# Patient Record
Sex: MALE | Race: WHITE | NOT HISPANIC OR LATINO | Employment: FULL TIME | URBAN - METROPOLITAN AREA
[De-identification: names, ages, dates, MRNs, and addresses within clinical notes are randomized per-mention and may not be internally consistent; named-entity substitution may affect disease eponyms.]

---

## 2023-03-29 LAB
ALANINE AMINOTRANSFERASE (SGPT) (U/L) IN SER/PLAS: 18 U/L (ref 10–52)
ALBUMIN (G/DL) IN SER/PLAS: 4.1 G/DL (ref 3.4–5)
ALKALINE PHOSPHATASE (U/L) IN SER/PLAS: 68 U/L (ref 33–136)
ANION GAP IN SER/PLAS: 11 MMOL/L (ref 10–20)
ASPARTATE AMINOTRANSFERASE (SGOT) (U/L) IN SER/PLAS: 14 U/L (ref 9–39)
BILIRUBIN TOTAL (MG/DL) IN SER/PLAS: 0.9 MG/DL (ref 0–1.2)
C PEPTIDE (NG/ML) IN SER/PLAS: 0.3 NG/ML (ref 0.7–3.9)
CALCIUM (MG/DL) IN SER/PLAS: 9.6 MG/DL (ref 8.6–10.3)
CARBON DIOXIDE, TOTAL (MMOL/L) IN SER/PLAS: 31 MMOL/L (ref 21–32)
CHLORIDE (MMOL/L) IN SER/PLAS: 100 MMOL/L (ref 98–107)
CREATININE (MG/DL) IN SER/PLAS: 1.15 MG/DL (ref 0.5–1.3)
GFR MALE: 71 ML/MIN/1.73M2
GLUCOSE (MG/DL) IN SER/PLAS: 404 MG/DL (ref 74–99)
POTASSIUM (MMOL/L) IN SER/PLAS: 4.6 MMOL/L (ref 3.5–5.3)
PROTEIN TOTAL: 6.9 G/DL (ref 6.4–8.2)
SODIUM (MMOL/L) IN SER/PLAS: 137 MMOL/L (ref 136–145)
THYROTROPIN (MIU/L) IN SER/PLAS BY DETECTION LIMIT <= 0.05 MIU/L: 2.95 MIU/L (ref 0.44–3.98)
UREA NITROGEN (MG/DL) IN SER/PLAS: 15 MG/DL (ref 6–23)

## 2023-06-12 ENCOUNTER — OFFICE VISIT (OUTPATIENT)
Dept: PRIMARY CARE | Facility: CLINIC | Age: 63
End: 2023-06-12
Payer: COMMERCIAL

## 2023-06-12 VITALS
HEART RATE: 70 BPM | OXYGEN SATURATION: 98 % | DIASTOLIC BLOOD PRESSURE: 78 MMHG | HEIGHT: 71 IN | BODY MASS INDEX: 24.22 KG/M2 | TEMPERATURE: 97.2 F | SYSTOLIC BLOOD PRESSURE: 128 MMHG | WEIGHT: 173 LBS | RESPIRATION RATE: 14 BRPM

## 2023-06-12 DIAGNOSIS — R39.15 URINARY URGENCY: ICD-10-CM

## 2023-06-12 DIAGNOSIS — R06.83 SNORING: ICD-10-CM

## 2023-06-12 DIAGNOSIS — R53.83 FATIGUE, UNSPECIFIED TYPE: ICD-10-CM

## 2023-06-12 DIAGNOSIS — Z12.5 SCREENING FOR PROSTATE CANCER: ICD-10-CM

## 2023-06-12 DIAGNOSIS — Z00.00 PERIODIC HEALTH ASSESSMENT, GENERAL SCREENING, ADULT: ICD-10-CM

## 2023-06-12 DIAGNOSIS — G62.9 NEUROPATHY: ICD-10-CM

## 2023-06-12 DIAGNOSIS — Z72.0 TOBACCO USE: ICD-10-CM

## 2023-06-12 DIAGNOSIS — E11.9 CONTROLLED TYPE 2 DIABETES MELLITUS WITHOUT COMPLICATION, UNSPECIFIED WHETHER LONG TERM INSULIN USE (MULTI): ICD-10-CM

## 2023-06-12 DIAGNOSIS — E78.5 HYPERLIPIDEMIA, UNSPECIFIED HYPERLIPIDEMIA TYPE: Primary | ICD-10-CM

## 2023-06-12 PROCEDURE — 99406 BEHAV CHNG SMOKING 3-10 MIN: CPT | Performed by: INTERNAL MEDICINE

## 2023-06-12 PROCEDURE — 99214 OFFICE O/P EST MOD 30 MIN: CPT | Performed by: INTERNAL MEDICINE

## 2023-06-12 PROCEDURE — 3074F SYST BP LT 130 MM HG: CPT | Performed by: INTERNAL MEDICINE

## 2023-06-12 PROCEDURE — 3078F DIAST BP <80 MM HG: CPT | Performed by: INTERNAL MEDICINE

## 2023-06-12 RX ORDER — INSULIN LISPRO 100 [IU]/ML
INJECTION, SOLUTION INTRAVENOUS; SUBCUTANEOUS
COMMUNITY
End: 2023-11-01 | Stop reason: SDUPTHER

## 2023-06-12 RX ORDER — BUPROPION HYDROCHLORIDE 150 MG/1
150 TABLET ORAL EVERY MORNING
Qty: 30 TABLET | Refills: 1 | Status: SHIPPED | OUTPATIENT
Start: 2023-06-12 | End: 2023-12-08 | Stop reason: ALTCHOICE

## 2023-06-12 RX ORDER — ATORVASTATIN CALCIUM 10 MG/1
10 TABLET, FILM COATED ORAL DAILY
COMMUNITY

## 2023-06-12 RX ORDER — INSULIN GLARGINE 100 [IU]/ML
18-22 INJECTION, SOLUTION SUBCUTANEOUS EVERY MORNING
COMMUNITY
Start: 2023-03-16 | End: 2024-05-22

## 2023-06-12 RX ORDER — TAMSULOSIN HYDROCHLORIDE 0.4 MG/1
0.4 CAPSULE ORAL DAILY
Qty: 30 CAPSULE | Refills: 11 | Status: SHIPPED | OUTPATIENT
Start: 2023-06-12 | End: 2023-07-18 | Stop reason: SDUPTHER

## 2023-06-12 RX ORDER — LEVOTHYROXINE SODIUM 200 UG/1
TABLET ORAL
COMMUNITY
End: 2023-11-17

## 2023-06-12 RX ORDER — LEVOTHYROXINE SODIUM 75 UG/1
75 TABLET ORAL DAILY
COMMUNITY
End: 2023-07-18

## 2023-06-12 RX ORDER — BLOOD-GLUCOSE METER
EACH MISCELLANEOUS 2 TIMES DAILY
COMMUNITY
Start: 2020-10-06

## 2023-06-12 ASSESSMENT — ENCOUNTER SYMPTOMS
COUGH: 0
WHEEZING: 0
SHORTNESS OF BREATH: 0
CONSTIPATION: 0
PALPITATIONS: 0
ABDOMINAL PAIN: 0

## 2023-06-12 NOTE — PROGRESS NOTES
"Subjective   Patient ID: Van Feliz is a 62 y.o. male who presents for Snoring (Snoring , and would like checked for sleep apnea.).  Witnessed apnea during sleep.  He does smoke daily, but is down to 5/day.    He also complains of urinary urgency for several month.  No F/C/S.  No flank pain.    He is also concerned about his memory.  He states little things like forgetting peoples names.  He does not repeat things to others and he denies others commenting on his memory.      Review of Systems   Respiratory:  Negative for cough, shortness of breath and wheezing.         Snoring   Cardiovascular:  Negative for chest pain and palpitations.   Gastrointestinal:  Negative for abdominal pain and constipation.   Genitourinary:  Positive for urgency.     Objective   /78 (BP Location: Right arm, Patient Position: Sitting, BP Cuff Size: Adult)   Pulse 70   Temp 36.2 °C (97.2 °F) (Tympanic)   Resp 14   Ht 1.803 m (5' 11\")   Wt 78.5 kg (173 lb)   SpO2 98%   BMI 24.13 kg/m²     Physical Exam  Vitals reviewed.   Constitutional:       Appearance: Normal appearance.   HENT:      Head: Normocephalic.   Cardiovascular:      Rate and Rhythm: Normal rate.   Pulmonary:      Effort: Pulmonary effort is normal.   Musculoskeletal:         General: Normal range of motion.   Neurological:      General: No focal deficit present.      Mental Status: He is alert.   Psychiatric:         Mood and Affect: Mood normal.         Assessment/Plan   Problem List Items Addressed This Visit    None  Visit Diagnoses       Hyperlipidemia, unspecified hyperlipidemia type    -  Primary    Controlled type 2 diabetes mellitus without complication, unspecified whether long term insulin use (CMS/[facility])        Relevant Orders    Basic Metabolic Panel    Urinary urgency        Relevant Medications    tamsulosin (Flomax) 0.4 mg 24 hr capsule    Other Relevant Orders    Urinalysis with Reflex Microscopic    Tobacco use        Relevant Medications    " buPROPion XL (Wellbutrin XL) 150 mg 24 hr tablet    Snoring        Relevant Orders    Home sleep apnea test (HSAT)    Fatigue, unspecified type        Relevant Orders    CBC    Home sleep apnea test (HSAT)    Screening for prostate cancer        Relevant Orders    Prostate Specific Antigen    Neuropathy        Relevant Orders    Vitamin B12    Periodic health assessment, general screening, adult        Relevant Orders    CBC    Thyroid Stimulating Hormone    Urinalysis with Reflex Microscopic    Basic Metabolic Panel        We discussed all of the above.  I reviewed recent lab results.   He is a good historian and recalls information both recent and remote.  Discussed memory.  He does admit to stress/anxiety helping care for his mother in law who has dementia.  With memory issues we will check B12 and TSH.  Discussed sleep as well.    He states he was told he seems to stop breathing periodically at night - with this witnessed apnea we will proceed with sleep study.    We discussed his smoking.  Risk and benefits.  He is agreeable to trying wellbutrin.  An additional 3 minutes was spent discussing this.    We also discussed his urinary urgency that has been present for months.  We will check a PSA as well as a BUN/Cr.  We discussed flomax and he is agreeable to trying this.    We will follow up in 1 month to see how all of the above is oding - sooner if any issues.

## 2023-06-13 ENCOUNTER — LAB (OUTPATIENT)
Dept: LAB | Facility: LAB | Age: 63
End: 2023-06-13
Payer: COMMERCIAL

## 2023-06-13 DIAGNOSIS — R53.83 FATIGUE, UNSPECIFIED TYPE: ICD-10-CM

## 2023-06-13 DIAGNOSIS — Z00.00 PERIODIC HEALTH ASSESSMENT, GENERAL SCREENING, ADULT: ICD-10-CM

## 2023-06-13 DIAGNOSIS — R39.15 URINARY URGENCY: ICD-10-CM

## 2023-06-13 DIAGNOSIS — G62.9 NEUROPATHY: ICD-10-CM

## 2023-06-13 DIAGNOSIS — E11.9 CONTROLLED TYPE 2 DIABETES MELLITUS WITHOUT COMPLICATION, UNSPECIFIED WHETHER LONG TERM INSULIN USE (MULTI): ICD-10-CM

## 2023-06-13 DIAGNOSIS — Z12.5 SCREENING FOR PROSTATE CANCER: ICD-10-CM

## 2023-06-13 LAB
ANION GAP IN SER/PLAS: 12 MMOL/L (ref 10–20)
APPEARANCE, URINE: CLEAR
BILIRUBIN, URINE: NEGATIVE
BLOOD, URINE: NEGATIVE
CALCIUM (MG/DL) IN SER/PLAS: 9.8 MG/DL (ref 8.6–10.3)
CARBON DIOXIDE, TOTAL (MMOL/L) IN SER/PLAS: 32 MMOL/L (ref 21–32)
CHLORIDE (MMOL/L) IN SER/PLAS: 95 MMOL/L (ref 98–107)
COBALAMIN (VITAMIN B12) (PG/ML) IN SER/PLAS: 460 PG/ML (ref 211–911)
COLOR, URINE: ABNORMAL
CREATININE (MG/DL) IN SER/PLAS: 1.11 MG/DL (ref 0.5–1.3)
ERYTHROCYTE DISTRIBUTION WIDTH (RATIO) BY AUTOMATED COUNT: 11.7 % (ref 11.5–14.5)
ERYTHROCYTE MEAN CORPUSCULAR HEMOGLOBIN CONCENTRATION (G/DL) BY AUTOMATED: 35.8 G/DL (ref 32–36)
ERYTHROCYTE MEAN CORPUSCULAR VOLUME (FL) BY AUTOMATED COUNT: 94 FL (ref 80–100)
ERYTHROCYTES (10*6/UL) IN BLOOD BY AUTOMATED COUNT: 5.11 X10E12/L (ref 4.5–5.9)
GFR MALE: 74 ML/MIN/1.73M2
GLUCOSE (MG/DL) IN SER/PLAS: 480 MG/DL (ref 74–99)
GLUCOSE, URINE: ABNORMAL MG/DL
HEMATOCRIT (%) IN BLOOD BY AUTOMATED COUNT: 47.8 % (ref 41–52)
HEMOGLOBIN (G/DL) IN BLOOD: 17.1 G/DL (ref 13.5–17.5)
KETONES, URINE: NEGATIVE MG/DL
LEUKOCYTE ESTERASE, URINE: NEGATIVE
LEUKOCYTES (10*3/UL) IN BLOOD BY AUTOMATED COUNT: 7.8 X10E9/L (ref 4.4–11.3)
NITRITE, URINE: NEGATIVE
PH, URINE: 7 (ref 5–8)
PLATELETS (10*3/UL) IN BLOOD AUTOMATED COUNT: 190 X10E9/L (ref 150–450)
POTASSIUM (MMOL/L) IN SER/PLAS: 5.2 MMOL/L (ref 3.5–5.3)
PROSTATE SPECIFIC AG (NG/ML) IN SER/PLAS: 0.42 NG/ML (ref 0–4)
PROTEIN, URINE: NEGATIVE MG/DL
SODIUM (MMOL/L) IN SER/PLAS: 134 MMOL/L (ref 136–145)
SPECIFIC GRAVITY, URINE: 1.02 (ref 1–1.03)
THYROTROPIN (MIU/L) IN SER/PLAS BY DETECTION LIMIT <= 0.05 MIU/L: 16.17 MIU/L (ref 0.44–3.98)
UREA NITROGEN (MG/DL) IN SER/PLAS: 16 MG/DL (ref 6–23)
UROBILINOGEN, URINE: <2 MG/DL (ref 0–1.9)

## 2023-06-13 PROCEDURE — 81003 URINALYSIS AUTO W/O SCOPE: CPT

## 2023-06-13 PROCEDURE — 84443 ASSAY THYROID STIM HORMONE: CPT

## 2023-06-13 PROCEDURE — 80048 BASIC METABOLIC PNL TOTAL CA: CPT

## 2023-06-13 PROCEDURE — 85027 COMPLETE CBC AUTOMATED: CPT

## 2023-06-13 PROCEDURE — 36415 COLL VENOUS BLD VENIPUNCTURE: CPT

## 2023-06-13 PROCEDURE — 82607 VITAMIN B-12: CPT

## 2023-06-13 PROCEDURE — 84153 ASSAY OF PSA TOTAL: CPT

## 2023-06-14 ENCOUNTER — TELEPHONE (OUTPATIENT)
Dept: PRIMARY CARE | Facility: CLINIC | Age: 63
End: 2023-06-14
Payer: COMMERCIAL

## 2023-07-18 ENCOUNTER — OFFICE VISIT (OUTPATIENT)
Dept: PRIMARY CARE | Facility: CLINIC | Age: 63
End: 2023-07-18
Payer: COMMERCIAL

## 2023-07-18 VITALS
DIASTOLIC BLOOD PRESSURE: 60 MMHG | WEIGHT: 173 LBS | SYSTOLIC BLOOD PRESSURE: 130 MMHG | HEIGHT: 71 IN | BODY MASS INDEX: 24.22 KG/M2 | OXYGEN SATURATION: 97 % | RESPIRATION RATE: 14 BRPM | TEMPERATURE: 98 F | HEART RATE: 77 BPM

## 2023-07-18 DIAGNOSIS — R39.15 URINARY URGENCY: ICD-10-CM

## 2023-07-18 DIAGNOSIS — R35.0 BENIGN PROSTATIC HYPERPLASIA WITH URINARY FREQUENCY: ICD-10-CM

## 2023-07-18 DIAGNOSIS — E78.00 PURE HYPERCHOLESTEROLEMIA: ICD-10-CM

## 2023-07-18 DIAGNOSIS — E03.9 HYPOTHYROIDISM, UNSPECIFIED TYPE: Primary | ICD-10-CM

## 2023-07-18 DIAGNOSIS — Z00.00 PERIODIC HEALTH ASSESSMENT, GENERAL SCREENING, ADULT: ICD-10-CM

## 2023-07-18 DIAGNOSIS — N40.1 BENIGN PROSTATIC HYPERPLASIA WITH URINARY FREQUENCY: ICD-10-CM

## 2023-07-18 PROCEDURE — 99213 OFFICE O/P EST LOW 20 MIN: CPT | Performed by: INTERNAL MEDICINE

## 2023-07-18 RX ORDER — TAMSULOSIN HYDROCHLORIDE 0.4 MG/1
0.4 CAPSULE ORAL DAILY
Qty: 90 CAPSULE | Refills: 3 | Status: SHIPPED | OUTPATIENT
Start: 2023-07-18 | End: 2024-07-17

## 2023-07-18 RX ORDER — LEVOTHYROXINE SODIUM 88 UG/1
88 TABLET ORAL DAILY
Qty: 30 TABLET | Refills: 11 | Status: SHIPPED | OUTPATIENT
Start: 2023-07-18 | End: 2024-07-17

## 2023-07-18 ASSESSMENT — ENCOUNTER SYMPTOMS
DIARRHEA: 0
WHEEZING: 0
ABDOMINAL PAIN: 0
CONSTIPATION: 0
PALPITATIONS: 0
SHORTNESS OF BREATH: 0
COUGH: 0

## 2023-07-18 NOTE — PROGRESS NOTES
"Subjective   Patient ID: Van Feliz is a 63 y.o. male who presents for Follow-up (Follow up , lab work.).  Feeling well overall.    No new complaints.  He does follow with Endo for his DM.    He states his energy level is up and down.  He is fairly active.  No issues with CP, SOB or dizzy spells.    He had issues with urinary urgency, but flomax has helped a lot.  No side effects.     Review of Systems   Respiratory:  Negative for cough, shortness of breath and wheezing.    Cardiovascular:  Negative for chest pain and palpitations.   Gastrointestinal:  Negative for abdominal pain, constipation and diarrhea.       Objective   /60 (BP Location: Left arm, Patient Position: Sitting, BP Cuff Size: Adult)   Pulse 77   Temp 36.7 °C (98 °F) (Tympanic)   Resp 14   Ht 1.803 m (5' 11\")   Wt 78.5 kg (173 lb)   SpO2 97%   BMI 24.13 kg/m²     Physical Exam  Vitals reviewed.   Constitutional:       Appearance: Normal appearance.   HENT:      Head: Normocephalic.   Cardiovascular:      Rate and Rhythm: Normal rate.   Pulmonary:      Effort: Pulmonary effort is normal.   Musculoskeletal:         General: Normal range of motion.   Neurological:      General: No focal deficit present.      Mental Status: He is alert.   Psychiatric:         Mood and Affect: Mood normal.         Assessment/Plan   Problem List Items Addressed This Visit    None  Visit Diagnoses       Hypothyroidism, unspecified type    -  Primary    Relevant Medications    levothyroxine (Synthroid, Levoxyl) 88 mcg tablet    Other Relevant Orders    Thyroid Stimulating Hormone    Pure hypercholesterolemia        Benign prostatic hyperplasia with urinary frequency        Urinary urgency        Relevant Medications    tamsulosin (Flomax) 0.4 mg 24 hr capsule    Periodic health assessment, general screening, adult        Relevant Orders    Referral to Gastroenterology        We reviewed and discussed all of the above including current medications and recent " lab results.  Reviewed his previous colonoscopy and he is due this year.    Discussed medication changes.    Follow up in 6 months with a 2 month recheck of his TSH.

## 2023-11-01 DIAGNOSIS — E10.69 TYPE 1 DIABETES MELLITUS WITH OTHER SPECIFIED COMPLICATION (MULTI): ICD-10-CM

## 2023-11-02 RX ORDER — INSULIN LISPRO 100 [IU]/ML
INJECTION, SOLUTION INTRAVENOUS; SUBCUTANEOUS
Qty: 15 ML | Refills: 2 | Status: SHIPPED | OUTPATIENT
Start: 2023-11-02

## 2023-11-17 DIAGNOSIS — E03.9 HYPOTHYROIDISM, UNSPECIFIED TYPE: Primary | ICD-10-CM

## 2023-11-17 RX ORDER — LEVOTHYROXINE SODIUM 200 UG/1
TABLET ORAL
Qty: 90 TABLET | Refills: 0 | Status: SHIPPED | OUTPATIENT
Start: 2023-11-17 | End: 2024-02-20

## 2023-12-08 ENCOUNTER — OFFICE VISIT (OUTPATIENT)
Dept: ENDOCRINOLOGY | Facility: CLINIC | Age: 63
End: 2023-12-08
Payer: COMMERCIAL

## 2023-12-08 VITALS
DIASTOLIC BLOOD PRESSURE: 76 MMHG | HEIGHT: 72 IN | SYSTOLIC BLOOD PRESSURE: 130 MMHG | BODY MASS INDEX: 23.98 KG/M2 | WEIGHT: 177 LBS

## 2023-12-08 DIAGNOSIS — I10 ESSENTIAL HYPERTENSION, BENIGN: ICD-10-CM

## 2023-12-08 DIAGNOSIS — E03.9 HYPOTHYROIDISM, UNSPECIFIED TYPE: ICD-10-CM

## 2023-12-08 DIAGNOSIS — E10.9 TYPE 1 DIABETES MELLITUS WITHOUT COMPLICATION (MULTI): Primary | ICD-10-CM

## 2023-12-08 DIAGNOSIS — R73.9 HYPERGLYCEMIA: ICD-10-CM

## 2023-12-08 LAB
POC FINGERSTICK BLOOD GLUCOSE: 173 MG/DL (ref 70–100)
POC HEMOGLOBIN A1C: 11 % (ref 4.2–6.5)

## 2023-12-08 PROCEDURE — 3078F DIAST BP <80 MM HG: CPT | Performed by: HOSPITALIST

## 2023-12-08 PROCEDURE — 3075F SYST BP GE 130 - 139MM HG: CPT | Performed by: HOSPITALIST

## 2023-12-08 PROCEDURE — 99214 OFFICE O/P EST MOD 30 MIN: CPT | Performed by: HOSPITALIST

## 2023-12-08 PROCEDURE — 82962 GLUCOSE BLOOD TEST: CPT | Performed by: HOSPITALIST

## 2023-12-08 PROCEDURE — 83036 HEMOGLOBIN GLYCOSYLATED A1C: CPT | Performed by: HOSPITALIST

## 2023-12-08 RX ORDER — BLOOD-GLUCOSE SENSOR
EACH MISCELLANEOUS
Qty: 6 EACH | Refills: 2 | Status: SHIPPED | OUTPATIENT
Start: 2023-12-08

## 2023-12-08 RX ORDER — BLOOD-GLUCOSE,RECEIVER,CONT
EACH MISCELLANEOUS
Qty: 1 EACH | Refills: 0 | Status: SHIPPED | OUTPATIENT
Start: 2023-12-08

## 2023-12-08 RX ORDER — GLUCAGON INJECTION, SOLUTION 1 MG/.2ML
1 INJECTION, SOLUTION SUBCUTANEOUS ONCE AS NEEDED
Qty: 0.4 ML | Refills: 1 | Status: SHIPPED | OUTPATIENT
Start: 2023-12-08

## 2023-12-08 RX ORDER — IBUPROFEN 200 MG
16 TABLET ORAL AS NEEDED
Qty: 50 TABLET | Refills: 12 | Status: SHIPPED | OUTPATIENT
Start: 2023-12-08 | End: 2024-12-07

## 2023-12-08 ASSESSMENT — ENCOUNTER SYMPTOMS
ARTHRALGIAS: 0
NERVOUS/ANXIOUS: 0
VOICE CHANGE: 0
TREMORS: 0
CHEST TIGHTNESS: 0
SLEEP DISTURBANCE: 0
TROUBLE SWALLOWING: 0
LIGHT-HEADEDNESS: 0
SORE THROAT: 0
HEADACHES: 0
CONSTITUTIONAL NEGATIVE: 1
ABDOMINAL DISTENTION: 0
DIARRHEA: 0
VOMITING: 0
SHORTNESS OF BREATH: 0
DYSURIA: 0
EYE ITCHING: 0
AGITATION: 0
PHOTOPHOBIA: 0
PALPITATIONS: 0
CONSTIPATION: 0
NAUSEA: 0
ABDOMINAL PAIN: 0
FREQUENCY: 0

## 2023-12-08 NOTE — PROGRESS NOTES
Subjective   Patient ID: Van Feliz is a 63 y.o. male who presents for Diabetes (Dx: 2020/PCP: Dr. Rodarte- needs to schedule appointment /Podiatry: does not see one /Eye exam: yearly /Patient testing glucose once daily; did not bring meter. /Last hga1c 3/16/23 14.1%/At times missing lunch insulin- so would increase dinner dose ) and Hypothyroidism (Takes thyroid medication with other medications ).  Lab Results   Component Value Date    HGBA1C 11.0 (A) 12/08/2023      HPI  See AP    Review of Systems   Constitutional: Negative.    HENT:  Negative for sore throat, trouble swallowing and voice change.    Eyes:  Negative for photophobia, itching and visual disturbance.   Respiratory:  Negative for chest tightness and shortness of breath.    Cardiovascular:  Negative for chest pain and palpitations.   Gastrointestinal:  Negative for abdominal distention, abdominal pain, constipation, diarrhea, nausea and vomiting.   Endocrine: Negative for cold intolerance, heat intolerance and polyuria.   Genitourinary:  Negative for dysuria and frequency.   Musculoskeletal:  Negative for arthralgias.   Skin:  Negative for pallor.   Allergic/Immunologic: Negative for environmental allergies.   Neurological:  Negative for tremors, light-headedness and headaches.   Psychiatric/Behavioral:  Negative for agitation and sleep disturbance. The patient is not nervous/anxious.        Objective   Physical Exam  Constitutional:       Appearance: Normal appearance.   HENT:      Head: Normocephalic.      Nose: Nose normal.      Mouth/Throat:      Mouth: Mucous membranes are moist.   Eyes:      Extraocular Movements: Extraocular movements intact.   Cardiovascular:      Rate and Rhythm: Normal rate.   Pulmonary:      Effort: Pulmonary effort is normal. No respiratory distress.   Abdominal:      General: There is no distension.   Musculoskeletal:         General: Normal range of motion.      Cervical back: Normal range of motion and neck  "supple.   Skin:     General: Skin is warm and dry.   Neurological:      Mental Status: He is alert and oriented to person, place, and time.   Psychiatric:         Mood and Affect: Mood normal.     Visit Vitals  /76   Ht 1.816 m (5' 11.5\")   Wt 80.3 kg (177 lb)   BMI 24.34 kg/m²   Smoking Status Every Day   BSA 2.01 m²        Assessment/Plan   Diagnoses and all orders for this visit:  Type 1 diabetes mellitus without complication (CMS/HCC)  -     POCT glycosylated hemoglobin (Hb A1C) manually resulted  -     POCT glucose manually resulted  Essential hypertension, benign  Hypothyroidism, unspecified type  Hyperglycemia      DM: was being managed like T 2 DM but lasb showed positive Rosy 64 with low c peptide    given controlled DM since Dx will manage as T1 DM  dx dm: 2 years ago , initially managed as type 2 DM , but he was mis dx, he is typ1 DM      Current regimen:( started march 16. 2023)   - lantus 20-0-0-0 ( paying 85 $ per month )   - humalog 7-5-7-0 , and SSI 1: 50 > 150 ( not using SSI much as he is not chceking it often)     metformin and glimepiride stopped last visit      Checking once a day typically but 2-3 times since last few days    Today BG - 200.  173 after bkfst   Yesterday BG - morning 300s, 218 in afternoon    Morning mostly high    Low BG- low BG 3 weeks [place].      Dexcom CGM was costing 750 $ in past     Diet - does drink juice occ, pop in past , now only diet pop, following with dietician in past     PLAN:   - lantus 25-0-0-0  - no change in humalog   - 15 g complex carb before going to airport  - advised to check BG 3- 4 times a day , given a log , advised to drop in 10 days with all the BG readings      - Discussed diabetic diet and diet modifications as a means to control diabetes, advised 45- 60g carb meals and 15 g snacks   - given he is now a Typ 1 DM and has to be on 4 injections a day he will need CGM. will prescribe again Dexcom G7 CGM  check BG 4 times a day " and bring meter for download next visit  on statin, LDL at goal   ,not on ACei/ ARB, discuss NV  Dicussed hypoglycemia management. Sent GVOKE pen      # hypothyroidism : labs  in past shows biochemically euthyroid , continue same dose    Follow PCP      RTC      SH- lives in [place].   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    travels a lot for work - every other week           Pedro Encinas MD

## 2023-12-08 NOTE — PATIENT INSTRUCTIONS
When SUGAR LESS THAN 70 OR SYMPTOMS OF LOW SUGARS TAKE 4 GLUCOSE TABLETS OR 4 OZ JUICE.   BEFORE GOING TO AIRPORT HAVE 2 CRACKERS AND 1 TBSP PEANUT BUTTER    INCREASE LANTUS TO 25 UNITS IN MORNING EXCEPT ON DAYS YOU TRAVEL TAKE 20 UNITS    TAKE HUMALOG 10-15 MIN BEFORE EATING.

## 2024-01-23 ENCOUNTER — OFFICE VISIT (OUTPATIENT)
Dept: PRIMARY CARE | Facility: CLINIC | Age: 64
End: 2024-01-23
Payer: COMMERCIAL

## 2024-01-23 VITALS
TEMPERATURE: 97.2 F | RESPIRATION RATE: 14 BRPM | SYSTOLIC BLOOD PRESSURE: 110 MMHG | DIASTOLIC BLOOD PRESSURE: 60 MMHG | WEIGHT: 172 LBS | BODY MASS INDEX: 23.3 KG/M2 | HEART RATE: 81 BPM | HEIGHT: 72 IN | OXYGEN SATURATION: 95 %

## 2024-01-23 DIAGNOSIS — Z12.5 SCREENING FOR PROSTATE CANCER: ICD-10-CM

## 2024-01-23 DIAGNOSIS — R35.0 BENIGN PROSTATIC HYPERPLASIA WITH URINARY FREQUENCY: ICD-10-CM

## 2024-01-23 DIAGNOSIS — M25.561 CHRONIC PAIN OF RIGHT KNEE: ICD-10-CM

## 2024-01-23 DIAGNOSIS — Z00.00 PERIODIC HEALTH ASSESSMENT, GENERAL SCREENING, ADULT: Primary | ICD-10-CM

## 2024-01-23 DIAGNOSIS — N40.1 BENIGN PROSTATIC HYPERPLASIA WITH URINARY FREQUENCY: ICD-10-CM

## 2024-01-23 DIAGNOSIS — N52.9 ERECTILE DYSFUNCTION, UNSPECIFIED ERECTILE DYSFUNCTION TYPE: ICD-10-CM

## 2024-01-23 DIAGNOSIS — C44.42 SQUAMOUS CELL CARCINOMA OF HEAD AND NECK: ICD-10-CM

## 2024-01-23 DIAGNOSIS — G89.29 CHRONIC PAIN OF RIGHT KNEE: ICD-10-CM

## 2024-01-23 DIAGNOSIS — G89.29 CHRONIC RIGHT-SIDED LOW BACK PAIN WITHOUT SCIATICA: ICD-10-CM

## 2024-01-23 DIAGNOSIS — E10.9 TYPE 1 DIABETES MELLITUS WITHOUT COMPLICATION (MULTI): ICD-10-CM

## 2024-01-23 DIAGNOSIS — M54.50 CHRONIC RIGHT-SIDED LOW BACK PAIN WITHOUT SCIATICA: ICD-10-CM

## 2024-01-23 DIAGNOSIS — I10 ESSENTIAL HYPERTENSION, BENIGN: ICD-10-CM

## 2024-01-23 DIAGNOSIS — E03.9 ACQUIRED HYPOTHYROIDISM: ICD-10-CM

## 2024-01-23 PROCEDURE — 99214 OFFICE O/P EST MOD 30 MIN: CPT | Performed by: INTERNAL MEDICINE

## 2024-01-23 PROCEDURE — 3074F SYST BP LT 130 MM HG: CPT | Performed by: INTERNAL MEDICINE

## 2024-01-23 PROCEDURE — 3078F DIAST BP <80 MM HG: CPT | Performed by: INTERNAL MEDICINE

## 2024-01-23 RX ORDER — TADALAFIL 5 MG/1
5 TABLET ORAL DAILY
Qty: 90 TABLET | Refills: 3 | Status: SHIPPED | OUTPATIENT
Start: 2024-01-23 | End: 2025-01-22

## 2024-01-23 ASSESSMENT — ENCOUNTER SYMPTOMS
HYPERTENSION: 1
BLOOD IN STOOL: 0
NAUSEA: 0
SHORTNESS OF BREATH: 0
ARTHRALGIAS: 1
WHEEZING: 0
PALPITATIONS: 0
ABDOMINAL PAIN: 0
COUGH: 0
DIARRHEA: 0
CONSTIPATION: 0
BACK PAIN: 1

## 2024-01-23 NOTE — PROGRESS NOTES
"Subjective   Patient ID: Van Feliz is a 63 y.o. male who presents for Hypertension.    Hypertension  Pertinent negatives include no chest pain, palpitations or shortness of breath.   Feeling well overall, however he is having right knee pain and right sided back pain.  Issues are up and donw.  They do not slow him down.  Still walks regularly.    He denies any issues with CP, SOB or dizzy spells.    Tamsulosin has resolved his prostate related issues.      Review of Systems   Respiratory:  Negative for cough, shortness of breath and wheezing.    Cardiovascular:  Negative for chest pain and palpitations.   Gastrointestinal:  Negative for abdominal pain, blood in stool, constipation, diarrhea and nausea.   Musculoskeletal:  Positive for arthralgias and back pain.       Objective   /60 (BP Location: Left arm, Patient Position: Sitting, BP Cuff Size: Adult)   Pulse 81   Temp 36.2 °C (97.2 °F) (Tympanic)   Resp 14   Ht 1.816 m (5' 11.5\")   Wt 78 kg (172 lb)   SpO2 95%   BMI 23.65 kg/m²     Physical Exam  Vitals reviewed.   Constitutional:       Appearance: Normal appearance.   HENT:      Head: Normocephalic.   Cardiovascular:      Rate and Rhythm: Normal rate.   Pulmonary:      Effort: Pulmonary effort is normal.   Musculoskeletal:         General: Normal range of motion.   Neurological:      General: No focal deficit present.      Mental Status: He is alert.   Psychiatric:         Mood and Affect: Mood normal.         Assessment/Plan   Problem List Items Addressed This Visit             ICD-10-CM    Essential hypertension, benign I10    Acquired hypothyroidism E03.9    Relevant Orders    Thyroid Stimulating Hormone    Squamous cell carcinoma of head and neck (CMS/HCC) C76.0    Type 1 diabetes mellitus without complication (CMS/HCC) E10.9     Other Visit Diagnoses         Codes    Periodic health assessment, general screening, adult    -  Primary Z00.00    Relevant Orders    CBC    Comprehensive " Metabolic Panel    Lipid Panel    Thyroid Stimulating Hormone    Thyroid Stimulating Hormone    Chronic pain of right knee     M25.561, G89.29    Chronic right-sided low back pain without sciatica     M54.50, G89.29    Benign prostatic hyperplasia with urinary frequency     N40.1, R35.0    Relevant Medications    tadalafil (Cialis) 5 mg tablet    Erectile dysfunction, unspecified erectile dysfunction type     N52.9    Relevant Medications    tadalafil (Cialis) 5 mg tablet    Screening for prostate cancer     Z12.5    Relevant Orders    Prostate Specific Antigen        Discussed all of the above.  FOR MSK pain - which is currently mild to moderate - we discussed Ibuprofen 600 mg TID prn taken with pepcid and plenty of water.  We also discussed PT if symptoms persist.  If non-resolving or worsening he should return.    We discussed cialis for both ED and BPH.  If good relief we could potentially try to stop tamsulosin.    We will monitor his thyroid.    HTN is controlled.  No changes.  Again, stressed need for good hydration.    Follow up in 6 months for APE -sooner if any issues.

## 2024-02-20 DIAGNOSIS — E03.9 HYPOTHYROIDISM, UNSPECIFIED TYPE: ICD-10-CM

## 2024-02-20 RX ORDER — LEVOTHYROXINE SODIUM 200 UG/1
TABLET ORAL
Qty: 90 TABLET | Refills: 0 | Status: SHIPPED | OUTPATIENT
Start: 2024-02-20 | End: 2024-05-22

## 2024-03-03 NOTE — PROGRESS NOTES
Subjective   Patient ID: Van Feliz is a 63 y.o. male who presents to [facility] for T1D. Dr Encinas patient, plans to return once she is back from maternity leave    Lab Results   Component Value Date    HGBA1C 11.0 (A) 12/08/2023      HPI  LV: 12/2023, Dr. Encinas     Current regimen: Humalog 11 BID, once 5 units; Lantus 20 every day    SMBG does not check sugars often, maybe once per week. Will be 250-300 in the morning when he does check (fasting).    Objective    Visit Vitals  Smoking Status Every Day   There were no vitals taken for this visit.    General: Well appearing, no acute distress  Heart: Normal rate  Neck: Soft, nontender, no lymphadenopathy, thyroid normal in size   Lungs: Breathing comfortably on room air   Abdomen: Soft, nontender  Extremities: Warm, no edema  Skin: No rashes    Assessment/Plan   Diagnoses and all orders for this visit:  Type 1 diabetes mellitus without complication (CMS/HCC)        -     Plan to start an insulin pump at future date, no changes to medical management         -     Recommend daily glucose monitoring, as once weekly is not sufficient        -     A1C ordered for after 3/8        -     on atorvastatin 10mg qd  Essential hypertension, benign       -      controlled  Hypothyroidism, unspecified type       -      no changes to medical management  Hyperglycemia      Norma Salgado DO  Family Medicine PGY3    I saw and evaluated the patient. I personally obtained the key and critical portions of the history and physical exam or was physically present for key and critical portions performed by the resident/fellow. I reviewed the resident/fellow's documentation and discussed the patient with the resident/fellow. I agree with the resident/fellow's medical decision making as documented in the note.     63 year old man with T1D who is prescribed MDI - Lantus 20 units, Humalog 11-5-11. He checks his BG once weekly and says fasting BG is 250-300. He does not have his meter today  for this appointment.   Sometimes misses humalog at lunch.  He is very self conscious about his diabetes - goes to his car to inject at lunch while at work. Discussed pump therapy with AID as a means to help him gain better control of his diabetes. He is interested in this and will discuss with his provider at next appointment. Reviewed the risks of uncontrolled diabetes, including MI, stroke, neuropathy, amputation, kidney disease, eye disease.     Kinga Painter, DO

## 2024-03-04 ENCOUNTER — OFFICE VISIT (OUTPATIENT)
Dept: ENDOCRINOLOGY | Facility: CLINIC | Age: 64
End: 2024-03-04
Payer: COMMERCIAL

## 2024-03-04 VITALS
HEART RATE: 106 BPM | BODY MASS INDEX: 23.19 KG/M2 | TEMPERATURE: 98.6 F | WEIGHT: 171.2 LBS | SYSTOLIC BLOOD PRESSURE: 83 MMHG | HEIGHT: 72 IN | DIASTOLIC BLOOD PRESSURE: 50 MMHG

## 2024-03-04 DIAGNOSIS — E10.9 TYPE 1 DIABETES MELLITUS WITHOUT COMPLICATION (MULTI): Primary | ICD-10-CM

## 2024-03-04 PROCEDURE — 99203 OFFICE O/P NEW LOW 30 MIN: CPT | Performed by: STUDENT IN AN ORGANIZED HEALTH CARE EDUCATION/TRAINING PROGRAM

## 2024-03-04 PROCEDURE — 3078F DIAST BP <80 MM HG: CPT | Performed by: STUDENT IN AN ORGANIZED HEALTH CARE EDUCATION/TRAINING PROGRAM

## 2024-03-04 PROCEDURE — 3074F SYST BP LT 130 MM HG: CPT | Performed by: STUDENT IN AN ORGANIZED HEALTH CARE EDUCATION/TRAINING PROGRAM

## 2024-03-11 ENCOUNTER — LAB (OUTPATIENT)
Dept: LAB | Facility: LAB | Age: 64
End: 2024-03-11
Payer: COMMERCIAL

## 2024-03-11 DIAGNOSIS — E03.9 ACQUIRED HYPOTHYROIDISM: ICD-10-CM

## 2024-03-11 DIAGNOSIS — Z00.00 PERIODIC HEALTH ASSESSMENT, GENERAL SCREENING, ADULT: ICD-10-CM

## 2024-03-11 DIAGNOSIS — Z12.5 SCREENING FOR PROSTATE CANCER: ICD-10-CM

## 2024-03-11 DIAGNOSIS — E03.9 ACQUIRED HYPOTHYROIDISM: Primary | ICD-10-CM

## 2024-03-11 DIAGNOSIS — E10.9 TYPE 1 DIABETES MELLITUS WITHOUT COMPLICATION (MULTI): ICD-10-CM

## 2024-03-11 LAB
EST. AVERAGE GLUCOSE BLD GHB EST-MCNC: 229 MG/DL
HBA1C MFR BLD: 9.6 %
PSA SERPL-MCNC: 0.37 NG/ML
T4 FREE SERPL-MCNC: 1.26 NG/DL (ref 0.61–1.12)
TSH SERPL-ACNC: 17.45 MIU/L (ref 0.44–3.98)

## 2024-03-11 PROCEDURE — 36415 COLL VENOUS BLD VENIPUNCTURE: CPT

## 2024-03-11 PROCEDURE — 83036 HEMOGLOBIN GLYCOSYLATED A1C: CPT

## 2024-03-11 PROCEDURE — 84443 ASSAY THYROID STIM HORMONE: CPT

## 2024-03-11 PROCEDURE — 84439 ASSAY OF FREE THYROXINE: CPT

## 2024-03-11 PROCEDURE — 84153 ASSAY OF PSA TOTAL: CPT

## 2024-03-15 ENCOUNTER — TELEPHONE (OUTPATIENT)
Dept: PRIMARY CARE | Facility: CLINIC | Age: 64
End: 2024-03-15
Payer: COMMERCIAL

## 2024-03-15 NOTE — TELEPHONE ENCOUNTER
----- Message from Harpreet Ackerman MD sent at 3/12/2024  9:51 AM EDT -----  HI FATMA, PLEASE SEE MY MESSAGE SENT TO CLERICAL POOL YESTERDAY.  THIS PATIENT'S THYROID STATUS IS A LITTLE UNUSUAL IN THAT HE HAS SIMULTANEOUSLY A HIGH T4 (THYROID HORMONE/LEVOTHYROXINE) LEVEL AND A HIGH TSH.  THIS SUGGESTS THAT THIS SO-CALLED NEGATIVE FEEDBACK HORMONAL SYSTEM IS NOT FUNCTIONING PROPERLY.  AS THE SYSTEM WAS APPARENTLY FUNCTIONING PROPERLY 11 MONTHS AGO, THESE LABS SUGGEST SOMETHING HAS CHANGED IN THE INTERIM THAT IS MAKING IT DIFFICULT FOR HIS BODY TO RECOGNIZE PRESENCE OF APPROPRIATE THYROID HORMONE LEVELS.  SOME MEDS AND SUPPLEMENTS CAN INTERFERE WITH  THIS SYSTEM, I BELIEVE BIOTIN SUPPLEMENT IS ONE.  HE SHOULD CONTINUE SAME DOSE OF THYROID HORMONE FOR NOW AS HE IS APPARENTLY CLINICALLY WITH A NORMAL THYROID STATUS, AND SEEK INPUT FROM DR. LAGUNAS UPON HIS RETURN ABOUT THESE LAB ABNORMALITIES.  THIS COULD BE SOMETHING THAT DR. FIELDS COULD ADDRESS IN MORE DETAIL WHEN SHE RETURNS FROM MATERNITY LEAVE AS WELL.  THX  ----- Message -----  From: Lab, Background User  Sent: 3/11/2024   6:36 PM EDT  To: Harpreet Ackerman MD

## 2024-03-16 DIAGNOSIS — E03.9 ACQUIRED HYPOTHYROIDISM: Primary | ICD-10-CM

## 2024-05-06 ENCOUNTER — OFFICE VISIT (OUTPATIENT)
Dept: PRIMARY CARE | Facility: CLINIC | Age: 64
End: 2024-05-06
Payer: COMMERCIAL

## 2024-05-06 VITALS
HEART RATE: 70 BPM | TEMPERATURE: 97.4 F | RESPIRATION RATE: 14 BRPM | HEIGHT: 72 IN | WEIGHT: 166 LBS | BODY MASS INDEX: 22.48 KG/M2 | DIASTOLIC BLOOD PRESSURE: 60 MMHG | SYSTOLIC BLOOD PRESSURE: 110 MMHG | OXYGEN SATURATION: 97 %

## 2024-05-06 DIAGNOSIS — E03.9 ACQUIRED HYPOTHYROIDISM: ICD-10-CM

## 2024-05-06 DIAGNOSIS — G89.29 CHRONIC BILATERAL LOW BACK PAIN WITH LEFT-SIDED SCIATICA: ICD-10-CM

## 2024-05-06 DIAGNOSIS — I10 ESSENTIAL HYPERTENSION, BENIGN: ICD-10-CM

## 2024-05-06 DIAGNOSIS — M54.42 CHRONIC BILATERAL LOW BACK PAIN WITH LEFT-SIDED SCIATICA: ICD-10-CM

## 2024-05-06 DIAGNOSIS — Z00.00 PERIODIC HEALTH ASSESSMENT, GENERAL SCREENING, ADULT: Primary | ICD-10-CM

## 2024-05-06 PROCEDURE — 3074F SYST BP LT 130 MM HG: CPT | Performed by: INTERNAL MEDICINE

## 2024-05-06 PROCEDURE — 3078F DIAST BP <80 MM HG: CPT | Performed by: INTERNAL MEDICINE

## 2024-05-06 PROCEDURE — 99396 PREV VISIT EST AGE 40-64: CPT | Performed by: INTERNAL MEDICINE

## 2024-05-06 PROCEDURE — 3046F HEMOGLOBIN A1C LEVEL >9.0%: CPT | Performed by: INTERNAL MEDICINE

## 2024-05-06 RX ORDER — MELOXICAM 15 MG/1
15 TABLET ORAL DAILY
Qty: 30 TABLET | Refills: 11 | Status: SHIPPED | OUTPATIENT
Start: 2024-05-06 | End: 2025-05-06

## 2024-05-06 RX ORDER — CYCLOBENZAPRINE HCL 5 MG
5 TABLET ORAL NIGHTLY PRN
Qty: 15 TABLET | Refills: 0 | Status: SHIPPED | OUTPATIENT
Start: 2024-05-06 | End: 2024-05-21

## 2024-05-06 RX ORDER — DULOXETIN HYDROCHLORIDE 20 MG/1
20 CAPSULE, DELAYED RELEASE ORAL DAILY
Qty: 30 CAPSULE | Refills: 5 | Status: SHIPPED | OUTPATIENT
Start: 2024-05-06 | End: 2024-11-02

## 2024-05-06 ASSESSMENT — ENCOUNTER SYMPTOMS
SHORTNESS OF BREATH: 0
WHEEZING: 0
BACK PAIN: 1
FATIGUE: 1
NAUSEA: 0
SLEEP DISTURBANCE: 1
COUGH: 0
DIARRHEA: 0
CONSTIPATION: 1

## 2024-05-06 ASSESSMENT — PATIENT HEALTH QUESTIONNAIRE - PHQ9
2. FEELING DOWN, DEPRESSED OR HOPELESS: NOT AT ALL
SUM OF ALL RESPONSES TO PHQ9 QUESTIONS 1 AND 2: 0
1. LITTLE INTEREST OR PLEASURE IN DOING THINGS: NOT AT ALL

## 2024-05-06 NOTE — PROGRESS NOTES
"Subjective   Patient ID: Van Feilz is a 63 y.o. male who presents for Annual Exam.    Overall doing well.  Patient is fairly active.  Denies any issues with CP,SOB or dizzy spells.  Some anxiety with family issues. Denies any issues with HA, numbness or tingling.  No issues or changes with bowel or bladder habits.  Issues with recurrent LBP ever since he lifted something funny back around [place].      Review of Systems   Constitutional:  Positive for fatigue.   Respiratory:  Negative for cough, shortness of breath and wheezing.    Gastrointestinal:  Positive for constipation. Negative for diarrhea and nausea.        X 2 months   Musculoskeletal:  Positive for back pain.        Back pain down left lower ext x 2 months   Psychiatric/Behavioral:  Positive for sleep disturbance.         D/t pain      Objective   /60 (BP Location: Left arm, Patient Position: Sitting, BP Cuff Size: Adult)   Pulse 70   Temp 36.3 °C (97.4 °F) (Tympanic)   Resp 14   Ht 1.816 m (5' 11.5\")   Wt 75.3 kg (166 lb)   SpO2 97%   BMI 22.83 kg/m²     Physical Exam  Vitals reviewed.   Constitutional:       Appearance: Normal appearance.   HENT:      Head: Normocephalic.   Cardiovascular:      Rate and Rhythm: Normal rate and regular rhythm.   Pulmonary:      Effort: Pulmonary effort is normal.      Breath sounds: Normal breath sounds.   Musculoskeletal:         General: Normal range of motion.   Neurological:      General: No focal deficit present.      Mental Status: He is alert.   Psychiatric:         Mood and Affect: Mood normal.         Assessment/Plan   Problem List Items Addressed This Visit             ICD-10-CM    Essential hypertension, benign I10    Acquired hypothyroidism E03.9     Other Visit Diagnoses         Codes    Periodic health assessment, general screening, adult    -  Primary Z00.00    Relevant Orders    CBC    Comprehensive Metabolic Panel    Lipid Panel    Thyroid Stimulating Hormone    Chronic bilateral low " back pain with left-sided sciatica     M54.42, G89.29    Relevant Medications    meloxicam (Mobic) 15 mg tablet    cyclobenzaprine (Flexeril) 5 mg tablet    DULoxetine (Cymbalta) 20 mg DR capsule    Other Relevant Orders    XR lumbar spine 2-3 views        Physical exam is unremarkable.  We reviewed and discussed all the above.  We discussed current medications as well as most recent test results.  He is due for reassessment.    We discussed the importance and benefits of a healthy diet that is both low in sugars and low in saturated fats.  We reviewed and discussed the benefits of regular physical exercise especially when at or above a level of 150 minutes/week.  We also discussed the importance of stress management and good sleep hygiene.  Due to stress/anxiety and LBP, we also discussed potential benefit of Cymbalta.  He is agreeable to trying.    We will continue to work on lifestyle improvements and follow-up in 2 months, sooner if any issues should arise.

## 2024-05-09 ENCOUNTER — HOSPITAL ENCOUNTER (OUTPATIENT)
Dept: RADIOLOGY | Facility: CLINIC | Age: 64
Discharge: HOME | End: 2024-05-09
Payer: COMMERCIAL

## 2024-05-09 DIAGNOSIS — G89.29 CHRONIC BILATERAL LOW BACK PAIN WITH LEFT-SIDED SCIATICA: ICD-10-CM

## 2024-05-09 DIAGNOSIS — M54.42 CHRONIC BILATERAL LOW BACK PAIN WITH LEFT-SIDED SCIATICA: ICD-10-CM

## 2024-05-09 PROCEDURE — 72110 X-RAY EXAM L-2 SPINE 4/>VWS: CPT | Performed by: STUDENT IN AN ORGANIZED HEALTH CARE EDUCATION/TRAINING PROGRAM

## 2024-05-09 PROCEDURE — 72110 X-RAY EXAM L-2 SPINE 4/>VWS: CPT

## 2024-05-14 ENCOUNTER — TELEPHONE (OUTPATIENT)
Dept: ENDOCRINOLOGY | Facility: CLINIC | Age: 64
End: 2024-05-14
Payer: COMMERCIAL

## 2024-05-14 DIAGNOSIS — M43.16 SPONDYLOLISTHESIS OF LUMBAR REGION: Primary | ICD-10-CM

## 2024-05-14 NOTE — TELEPHONE ENCOUNTER
Van left a voice mail stating that he collapsed in his family room and went to the ER at [facility].  He said he hasn't been sleeping well and has   Been experiencing back pain.  This is just a FYI for you.

## 2024-05-21 DIAGNOSIS — E10.69 TYPE 1 DIABETES MELLITUS WITH OTHER SPECIFIED COMPLICATION (MULTI): Primary | ICD-10-CM

## 2024-05-21 DIAGNOSIS — E10.9 TYPE 1 DIABETES MELLITUS WITHOUT COMPLICATION (MULTI): Primary | ICD-10-CM

## 2024-05-21 NOTE — TELEPHONE ENCOUNTER
Van called 5-21-24 looking for a refill on his Levothyroxine 200 mgc  1 every day #90 1 refill to his local [facility].  Thanks

## 2024-05-22 DIAGNOSIS — E03.9 HYPOTHYROIDISM, UNSPECIFIED TYPE: ICD-10-CM

## 2024-05-22 RX ORDER — INSULIN GLARGINE 100 [IU]/ML
INJECTION, SOLUTION SUBCUTANEOUS
Qty: 15 ML | Refills: 0 | Status: SHIPPED | OUTPATIENT
Start: 2024-05-22 | End: 2024-05-28 | Stop reason: SDUPTHER

## 2024-05-22 RX ORDER — INSULIN GLARGINE 100 [IU]/ML
18-22 INJECTION, SOLUTION SUBCUTANEOUS EVERY MORNING
Qty: 3 ML | Refills: 11 | OUTPATIENT
Start: 2024-05-22

## 2024-05-22 RX ORDER — LEVOTHYROXINE SODIUM 200 UG/1
TABLET ORAL
Qty: 90 TABLET | Refills: 0 | Status: SHIPPED | OUTPATIENT
Start: 2024-05-22

## 2024-05-28 DIAGNOSIS — E10.9 TYPE 1 DIABETES MELLITUS WITHOUT COMPLICATION (MULTI): ICD-10-CM

## 2024-05-28 RX ORDER — INSULIN GLARGINE 100 [IU]/ML
INJECTION, SOLUTION SUBCUTANEOUS
Qty: 15 ML | Refills: 0 | Status: SHIPPED | OUTPATIENT
Start: 2024-05-28

## 2024-06-10 ENCOUNTER — TELEPHONE (OUTPATIENT)
Dept: ENDOCRINOLOGY | Facility: CLINIC | Age: 64
End: 2024-06-10
Payer: COMMERCIAL

## 2024-06-10 NOTE — TELEPHONE ENCOUNTER
Celia Feliz (Noemi' wife) called 6-10-24.  She had to call the EMS yesterday.  Van's sugar went down to 40 and he was unresponsive.  The EMS got it  Back up to 54.  She said the past few months he has been very fatigued, his  Appetite is off and he has been cold.  I asked what his sugars have been.  He  Only tests 1 day daily.    6-10-24  am  290  6-09-24  am  246  6-08-24  am  193  6-08-24  am  300.      She feels something is off with his diabetes or thyroid.  He has an appt. With  Dr. Rodarte tomorrow.  It appears he is taking his insulin as directed.  Please advise.

## 2024-06-11 ENCOUNTER — OFFICE VISIT (OUTPATIENT)
Dept: PRIMARY CARE | Facility: CLINIC | Age: 64
End: 2024-06-11
Payer: COMMERCIAL

## 2024-06-11 ENCOUNTER — LAB (OUTPATIENT)
Dept: LAB | Facility: LAB | Age: 64
End: 2024-06-11
Payer: COMMERCIAL

## 2024-06-11 VITALS
OXYGEN SATURATION: 98 % | WEIGHT: 158 LBS | HEIGHT: 72 IN | SYSTOLIC BLOOD PRESSURE: 100 MMHG | BODY MASS INDEX: 21.4 KG/M2 | TEMPERATURE: 98.7 F | HEART RATE: 80 BPM | RESPIRATION RATE: 14 BRPM | DIASTOLIC BLOOD PRESSURE: 60 MMHG

## 2024-06-11 DIAGNOSIS — E03.9 ACQUIRED HYPOTHYROIDISM: ICD-10-CM

## 2024-06-11 DIAGNOSIS — K59.00 CONSTIPATION, UNSPECIFIED CONSTIPATION TYPE: ICD-10-CM

## 2024-06-11 DIAGNOSIS — I10 ESSENTIAL HYPERTENSION, BENIGN: ICD-10-CM

## 2024-06-11 DIAGNOSIS — E10.9 TYPE 1 DIABETES MELLITUS WITHOUT COMPLICATION (MULTI): ICD-10-CM

## 2024-06-11 DIAGNOSIS — E16.0 HYPOGLYCEMIA DUE TO INSULIN: Primary | ICD-10-CM

## 2024-06-11 DIAGNOSIS — Z00.00 PERIODIC HEALTH ASSESSMENT, GENERAL SCREENING, ADULT: ICD-10-CM

## 2024-06-11 DIAGNOSIS — T38.3X5A HYPOGLYCEMIA DUE TO INSULIN: Primary | ICD-10-CM

## 2024-06-11 LAB
ALBUMIN SERPL BCP-MCNC: 4.3 G/DL (ref 3.4–5)
ALP SERPL-CCNC: 76 U/L (ref 33–136)
ALT SERPL W P-5'-P-CCNC: 24 U/L (ref 10–52)
ANION GAP SERPL CALC-SCNC: 9 MMOL/L (ref 10–20)
AST SERPL W P-5'-P-CCNC: 27 U/L (ref 9–39)
BILIRUB SERPL-MCNC: 0.7 MG/DL (ref 0–1.2)
BUN SERPL-MCNC: 21 MG/DL (ref 6–23)
CALCIUM SERPL-MCNC: 9.5 MG/DL (ref 8.6–10.3)
CHLORIDE SERPL-SCNC: 93 MMOL/L (ref 98–107)
CHOLEST SERPL-MCNC: 174 MG/DL (ref 0–199)
CHOLESTEROL/HDL RATIO: 2.1
CO2 SERPL-SCNC: 31 MMOL/L (ref 21–32)
CREAT SERPL-MCNC: 1.09 MG/DL (ref 0.5–1.3)
EGFRCR SERPLBLD CKD-EPI 2021: 76 ML/MIN/1.73M*2
ERYTHROCYTE [DISTWIDTH] IN BLOOD BY AUTOMATED COUNT: 12.2 % (ref 11.5–14.5)
GLUCOSE SERPL-MCNC: 140 MG/DL (ref 74–99)
HCT VFR BLD AUTO: 44.5 % (ref 41–52)
HDLC SERPL-MCNC: 81.1 MG/DL
HGB BLD-MCNC: 15.5 G/DL (ref 13.5–17.5)
LDLC SERPL CALC-MCNC: 78 MG/DL
MCH RBC QN AUTO: 33.1 PG (ref 26–34)
MCHC RBC AUTO-ENTMCNC: 34.8 G/DL (ref 32–36)
MCV RBC AUTO: 95 FL (ref 80–100)
NON HDL CHOLESTEROL: 93 MG/DL (ref 0–149)
NRBC BLD-RTO: 0 /100 WBCS (ref 0–0)
PLATELET # BLD AUTO: 219 X10*3/UL (ref 150–450)
POTASSIUM SERPL-SCNC: 4.4 MMOL/L (ref 3.5–5.3)
PROT SERPL-MCNC: 6.6 G/DL (ref 6.4–8.2)
RBC # BLD AUTO: 4.68 X10*6/UL (ref 4.5–5.9)
SODIUM SERPL-SCNC: 129 MMOL/L (ref 136–145)
T4 FREE SERPL-MCNC: 0.95 NG/DL (ref 0.61–1.12)
TRIGL SERPL-MCNC: 74 MG/DL (ref 0–149)
TSH SERPL-ACNC: 22.19 MIU/L (ref 0.44–3.98)
VLDL: 15 MG/DL (ref 0–40)
WBC # BLD AUTO: 9.2 X10*3/UL (ref 4.4–11.3)

## 2024-06-11 PROCEDURE — 84439 ASSAY OF FREE THYROXINE: CPT

## 2024-06-11 PROCEDURE — 80061 LIPID PANEL: CPT

## 2024-06-11 PROCEDURE — 99214 OFFICE O/P EST MOD 30 MIN: CPT | Performed by: INTERNAL MEDICINE

## 2024-06-11 PROCEDURE — 80053 COMPREHEN METABOLIC PANEL: CPT

## 2024-06-11 PROCEDURE — 3074F SYST BP LT 130 MM HG: CPT | Performed by: INTERNAL MEDICINE

## 2024-06-11 PROCEDURE — 3046F HEMOGLOBIN A1C LEVEL >9.0%: CPT | Performed by: INTERNAL MEDICINE

## 2024-06-11 PROCEDURE — 3078F DIAST BP <80 MM HG: CPT | Performed by: INTERNAL MEDICINE

## 2024-06-11 PROCEDURE — 36415 COLL VENOUS BLD VENIPUNCTURE: CPT

## 2024-06-11 PROCEDURE — 3048F LDL-C <100 MG/DL: CPT | Performed by: INTERNAL MEDICINE

## 2024-06-11 PROCEDURE — 84443 ASSAY THYROID STIM HORMONE: CPT

## 2024-06-11 PROCEDURE — 85027 COMPLETE CBC AUTOMATED: CPT

## 2024-06-11 RX ORDER — BLOOD-GLUCOSE SENSOR
EACH MISCELLANEOUS
Qty: 6 EACH | Refills: 2 | Status: SHIPPED | OUTPATIENT
Start: 2024-06-11

## 2024-06-11 RX ORDER — BLOOD-GLUCOSE SENSOR
EACH MISCELLANEOUS
Qty: 6 EACH | Refills: 2 | Status: SHIPPED | OUTPATIENT
Start: 2024-06-11 | End: 2024-06-11

## 2024-06-11 ASSESSMENT — ENCOUNTER SYMPTOMS
BLOOD IN STOOL: 0
WHEEZING: 0
DIARRHEA: 0
SHORTNESS OF BREATH: 0
COUGH: 0
ABDOMINAL PAIN: 0
CONSTIPATION: 1
PALPITATIONS: 0
NAUSEA: 0

## 2024-06-11 NOTE — PROGRESS NOTES
"Subjective   Patient ID: Van Feliz is a 63 y.o. male who presents for Follow-up (ER / [facility] / Syncope.).    Syncope at home.  He awoke with EMS over him.  He was found to have low blood sugar.  Given treatment by EMS and he came to.  He states these have been occurring a little more frequently lately.  He states he keeps diet mountain dew around - we stressed need for regular soda to correct sugar or fruit juice etc.     He also complains of Fatigue.  Cold frequently.    Review of Systems   Respiratory:  Negative for cough, shortness of breath and wheezing.    Cardiovascular:  Negative for chest pain and palpitations.   Gastrointestinal:  Positive for constipation. Negative for abdominal pain, blood in stool, diarrhea and nausea.     Objective   /60 (BP Location: Left arm, Patient Position: Sitting, BP Cuff Size: Adult)   Pulse 80   Temp 37.1 °C (98.7 °F) (Tympanic)   Resp 14   Ht 1.816 m (5' 11.5\")   Wt 71.7 kg (158 lb)   SpO2 98%   BMI 21.73 kg/m²     Physical Exam  Vitals reviewed.   Constitutional:       Appearance: Normal appearance.   HENT:      Head: Normocephalic.   Cardiovascular:      Rate and Rhythm: Normal rate and regular rhythm.   Pulmonary:      Effort: Pulmonary effort is normal.      Breath sounds: Normal breath sounds.   Musculoskeletal:         General: Normal range of motion.   Neurological:      General: No focal deficit present.      Mental Status: He is alert.   Psychiatric:         Mood and Affect: Mood normal.         Assessment/Plan   Problem List Items Addressed This Visit             ICD-10-CM    Essential hypertension, benign I10    Acquired hypothyroidism E03.9    Type 1 diabetes mellitus without complication (Multi) E10.9    Relevant Medications    blood-glucose sensor (Dexcom G7 Sensor) device     Other Visit Diagnoses         Codes    Hypoglycemia due to insulin    -  Primary E16.0, T38.3X5A    Constipation, unspecified constipation type     K59.00        Hypoglycemia. "  We reviewed blood sugars to decrease his pre-meal insulin by 2 units until seen by his endocrinologist.  He has a CGM now as well.  We dicussed proper ways to correct hypoglycemia.    He also has symptoms consistent with hypothyroid.  We will recheck his TSH and see if this needs this further titrated.    We discussed stool softener daily until TSH is back.    Follow up in 2 months - sooner if any issues.

## 2024-06-12 ENCOUNTER — TELEPHONE (OUTPATIENT)
Dept: PRIMARY CARE | Facility: CLINIC | Age: 64
End: 2024-06-12
Payer: COMMERCIAL

## 2024-06-12 DIAGNOSIS — E03.9 HYPOTHYROIDISM, UNSPECIFIED TYPE: ICD-10-CM

## 2024-06-12 RX ORDER — LEVOTHYROXINE SODIUM 100 UG/1
100 TABLET ORAL DAILY
Qty: 30 TABLET | Refills: 11 | Status: SHIPPED | OUTPATIENT
Start: 2024-06-12 | End: 2025-06-12

## 2024-06-13 DIAGNOSIS — E03.9 HYPOTHYROIDISM, UNSPECIFIED TYPE: ICD-10-CM

## 2024-06-13 DIAGNOSIS — E87.1 HYPONATREMIA: Primary | ICD-10-CM

## 2024-06-20 ENCOUNTER — APPOINTMENT (OUTPATIENT)
Dept: PRIMARY CARE | Facility: CLINIC | Age: 64
End: 2024-06-20
Payer: COMMERCIAL

## 2024-06-24 ENCOUNTER — TELEPHONE (OUTPATIENT)
Dept: ENDOCRINOLOGY | Facility: CLINIC | Age: 64
End: 2024-06-24
Payer: COMMERCIAL

## 2024-06-24 DIAGNOSIS — E10.69 TYPE 1 DIABETES MELLITUS WITH OTHER SPECIFIED COMPLICATION (MULTI): ICD-10-CM

## 2024-06-24 RX ORDER — INSULIN LISPRO 100 [IU]/ML
INJECTION, SOLUTION INTRAVENOUS; SUBCUTANEOUS
Qty: 15 ML | Refills: 1 | Status: SHIPPED | OUTPATIENT
Start: 2024-06-24

## 2024-06-24 NOTE — TELEPHONE ENCOUNTER
Van called 6-24-24 asking for a refill on his Humulog Pens - he injects  5 units for breakfast, 5 units for lunch and 7 units at dinner plus a sliding  Scale.  Max does 50 units.  Please send to his local [facility].  Thank you

## 2024-07-03 ENCOUNTER — APPOINTMENT (OUTPATIENT)
Dept: ENDOCRINOLOGY | Facility: CLINIC | Age: 64
End: 2024-07-03
Payer: COMMERCIAL

## 2024-07-18 ENCOUNTER — APPOINTMENT (OUTPATIENT)
Dept: ENDOCRINOLOGY | Facility: CLINIC | Age: 64
End: 2024-07-18
Payer: COMMERCIAL

## 2024-07-18 VITALS
SYSTOLIC BLOOD PRESSURE: 96 MMHG | BODY MASS INDEX: 21.28 KG/M2 | DIASTOLIC BLOOD PRESSURE: 66 MMHG | HEIGHT: 71 IN | WEIGHT: 152 LBS

## 2024-07-18 DIAGNOSIS — E10.69 TYPE 1 DIABETES MELLITUS WITH OTHER SPECIFIED COMPLICATION (MULTI): Primary | ICD-10-CM

## 2024-07-18 DIAGNOSIS — R63.4 WEIGHT LOSS, UNINTENTIONAL: ICD-10-CM

## 2024-07-18 DIAGNOSIS — E10.9 TYPE 1 DIABETES MELLITUS WITHOUT COMPLICATION (MULTI): ICD-10-CM

## 2024-07-18 LAB
POC FINGERSTICK BLOOD GLUCOSE: 98 MG/DL (ref 70–100)
POC HEMOGLOBIN A1C: 7.6 % (ref 4.2–6.5)

## 2024-07-18 PROCEDURE — 3078F DIAST BP <80 MM HG: CPT | Performed by: HOSPITALIST

## 2024-07-18 PROCEDURE — 1123F ACP DISCUSS/DSCN MKR DOCD: CPT | Performed by: HOSPITALIST

## 2024-07-18 PROCEDURE — 1159F MED LIST DOCD IN RCRD: CPT | Performed by: HOSPITALIST

## 2024-07-18 PROCEDURE — 3048F LDL-C <100 MG/DL: CPT | Performed by: HOSPITALIST

## 2024-07-18 PROCEDURE — 82962 GLUCOSE BLOOD TEST: CPT | Performed by: HOSPITALIST

## 2024-07-18 PROCEDURE — 3046F HEMOGLOBIN A1C LEVEL >9.0%: CPT | Performed by: HOSPITALIST

## 2024-07-18 PROCEDURE — 3008F BODY MASS INDEX DOCD: CPT | Performed by: HOSPITALIST

## 2024-07-18 PROCEDURE — 99214 OFFICE O/P EST MOD 30 MIN: CPT | Performed by: HOSPITALIST

## 2024-07-18 PROCEDURE — 3074F SYST BP LT 130 MM HG: CPT | Performed by: HOSPITALIST

## 2024-07-18 PROCEDURE — 83036 HEMOGLOBIN GLYCOSYLATED A1C: CPT | Performed by: HOSPITALIST

## 2024-07-18 RX ORDER — GLUCAGON INJECTION, SOLUTION 1 MG/.2ML
1 INJECTION, SOLUTION SUBCUTANEOUS ONCE AS NEEDED
Qty: 0.4 ML | Refills: 1 | Status: SHIPPED | OUTPATIENT
Start: 2024-07-18

## 2024-07-18 ASSESSMENT — ENCOUNTER SYMPTOMS
PALPITATIONS: 0
LIGHT-HEADEDNESS: 0
VOICE CHANGE: 0
SHORTNESS OF BREATH: 0
AGITATION: 0
VOMITING: 0
CONSTIPATION: 1
DYSURIA: 0
FREQUENCY: 0
CHEST TIGHTNESS: 0
ARTHRALGIAS: 0
EYE ITCHING: 0
TREMORS: 0
TROUBLE SWALLOWING: 0
NERVOUS/ANXIOUS: 0
SLEEP DISTURBANCE: 0
ABDOMINAL PAIN: 0
FATIGUE: 1
SORE THROAT: 0
DIARRHEA: 0
ABDOMINAL DISTENTION: 0
UNEXPECTED WEIGHT CHANGE: 1
PHOTOPHOBIA: 0
HEADACHES: 0
NAUSEA: 0

## 2024-07-18 NOTE — PROGRESS NOTES
Subjective   Patient ID: Van Feliz is a 64 y.o. male who presents for Diabetes (Dx: 2020/PCP: Dr. Rodarte /Podiatry: does not see one /Eye exam: yearly,6/2024 /Patient testing glucose 4 times daily with dexcom G7- phone as reader. /Pt adhering and benefiting from cgm treatment. /Friday before mothers day- dizzy and collasped- went to ER /Near fathers day- took nap and awoke with 4 EMS guys standing over him, BG found to be in the 40's /3/11/2024 hga1c 9.6%) and Hypothyroidism (Current regimen: levothyroxine 200mcg + 100mcg daily ).  Lab Results   Component Value Date    TSH 22.19 (H) 06/11/2024         Lab Results   Component Value Date    HGBA1C 7.6 (A) 07/18/2024      HPI  See assessment and plan  Review of Systems   Constitutional:  Positive for fatigue and unexpected weight change.   HENT:  Negative for sore throat, trouble swallowing and voice change.    Eyes:  Negative for photophobia, itching and visual disturbance.   Respiratory:  Negative for chest tightness and shortness of breath.    Cardiovascular:  Negative for chest pain and palpitations.   Gastrointestinal:  Positive for constipation. Negative for abdominal distention, abdominal pain, diarrhea, nausea and vomiting.   Endocrine: Negative for cold intolerance, heat intolerance and polyuria.   Genitourinary:  Negative for dysuria and frequency.   Musculoskeletal:  Negative for arthralgias.   Skin:  Negative for pallor.   Allergic/Immunologic: Negative for environmental allergies.   Neurological:  Negative for tremors, light-headedness and headaches.   Psychiatric/Behavioral:  Negative for agitation and sleep disturbance. The patient is not nervous/anxious.        Objective   Physical Exam  Constitutional:       Appearance: Normal appearance.   HENT:      Head: Normocephalic.      Nose: Nose normal.      Mouth/Throat:      Mouth: Mucous membranes are moist.   Eyes:      Extraocular Movements: Extraocular movements intact.   Cardiovascular:       "Rate and Rhythm: Normal rate.   Pulmonary:      Effort: Pulmonary effort is normal. No respiratory distress.   Abdominal:      General: There is no distension.   Musculoskeletal:         General: Normal range of motion.      Cervical back: Normal range of motion and neck supple.   Skin:     General: Skin is warm and dry.   Neurological:      Mental Status: He is alert and oriented to person, place, and time.   Psychiatric:         Mood and Affect: Mood normal.      Visit Vitals  BP 96/66   Ht 1.803 m (5' 11\")   Wt 68.9 kg (152 lb)   BMI 21.20 kg/m²   Smoking Status Every Day   BSA 1.86 m²        Assessment/Plan   Diagnoses and all orders for this visit:  Type 1 diabetes mellitus with other specified complication (Multi)  -     POCT glucose manually resulted  -     POCT glycosylated hemoglobin (Hb A1C) manually resulted  -     Comprehensive metabolic panel; Future  -     TSH with reflex to Free T4 if abnormal; Future  -     Tissue Transglutaminase IgA; Future  -     Tissue Transglutaminase IgG; Future  -     CBC; Future  -     Prostate Specific Antigen, Screen; Future  Weight loss, unintentional  -     Comprehensive metabolic panel; Future  -     TSH with reflex to Free T4 if abnormal; Future  -     Tissue Transglutaminase IgA; Future  -     Tissue Transglutaminase IgG; Future  -     CBC; Future  -     Prostate Specific Antigen, Screen; Future  -     Cortisol; Future  Type 1 diabetes mellitus without complication (Multi)  -     glucagon (Gvoke HypoPen 1-Pack) 1 mg/0.2 mL auto-injector; Inject 1 mg under the skin 1 time if needed (when Bg < 70 and unable to eat) for up to 1 dose.       DM: was being managed like T 2 DM but lasb showed positive Rosy 64 with low c peptide    given controlled DM since Dx will manage as T1 DM  dx dm: 2 years ago , initially managed as type 2 DM , but he was mis dx, he is typ1 DM      Current regimen:( started basal bolus march 16. 2023)   - lantus 20-0-0-0 ( paying 85 $ per month )   - " humalog 7-5-7-0 , and SSI 1: 50 > 150      metformin and glimepiride stopped in past.      He has a Dexcom G7.  Could not downloaded reviewed it on his phone.  In the last 30 days.  Time in range 51% low sugar 8%    Interval history: 5/10/2024 went to ER due to lightheadedness.  Hypoglycemic episode in March 2024 requiring hospitalization.     Dexcom CGM was costing 750 $ in past  Diet - does drink juice occ, pop in past , now only diet pop,     PLAN:   -Change lantus 18-0-0-0  -Change Humalog 5-5-5-0. SSI 1: 50 > 150, given return instructions  -In past discussed 15 g complex carb before going to airport  -Call me in 2 weeks with blood sugar readings.  Before food and bedtime.    - Discussed diabetic diet and diet modifications as a means to control diabetes, advised 45- 60g carb meals and 15 g snacks   - given he is now a Typ 1 DM and has to be on 4 injections a day continue CGM use.        on statin, LDL at goal   ,not on ACei/ ARB, cannot start it given his low blood pressures readings.  Dicussed hypoglycemia management. Sent GVOKE pen again      # hypothyroidism : Recent TSH 22.  Dose was changed by primary care physician.     Follow PCP      # Unintentional weight loss:   He mentions he has progressively lost 40 pounds over the 1 or 2 years.  He mentions he has lower appetite.  No other GI symptoms.  Unclear cause.  His daughter has celiac disease.  Will check for celiac, will check TFT, PSA, cortisol  He mentions he is up-to-date with colonoscopy.  Also follow-up with Dr. Rodarte    C      - lives in [place].   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    travels a lot for work - every other week

## 2024-07-23 ENCOUNTER — APPOINTMENT (OUTPATIENT)
Dept: PRIMARY CARE | Facility: CLINIC | Age: 64
End: 2024-07-23
Payer: COMMERCIAL

## 2024-07-30 DIAGNOSIS — R39.15 URINARY URGENCY: ICD-10-CM

## 2024-07-30 RX ORDER — TAMSULOSIN HYDROCHLORIDE 0.4 MG/1
0.4 CAPSULE ORAL DAILY
Qty: 60 CAPSULE | Refills: 0 | Status: SHIPPED | OUTPATIENT
Start: 2024-07-30

## 2024-07-31 DIAGNOSIS — E10.9 TYPE 1 DIABETES MELLITUS WITHOUT COMPLICATION (MULTI): ICD-10-CM

## 2024-07-31 RX ORDER — GLUCAGON 1 MG
1 VIAL (EA) INJECTION ONCE AS NEEDED
Qty: 1 EACH | Refills: 1 | Status: SHIPPED | OUTPATIENT
Start: 2024-07-31

## 2024-08-17 DIAGNOSIS — E03.9 HYPOTHYROIDISM, UNSPECIFIED TYPE: ICD-10-CM

## 2024-08-19 RX ORDER — LEVOTHYROXINE SODIUM 200 UG/1
TABLET ORAL
Qty: 90 TABLET | Refills: 0 | Status: SHIPPED | OUTPATIENT
Start: 2024-08-19

## 2024-10-01 DIAGNOSIS — R39.15 URINARY URGENCY: ICD-10-CM

## 2024-10-02 RX ORDER — TAMSULOSIN HYDROCHLORIDE 0.4 MG/1
0.4 CAPSULE ORAL DAILY
Qty: 60 CAPSULE | Refills: 3 | Status: SHIPPED | OUTPATIENT
Start: 2024-10-02

## 2024-10-04 ENCOUNTER — TELEPHONE (OUTPATIENT)
Dept: ENDOCRINOLOGY | Facility: CLINIC | Age: 64
End: 2024-10-04
Payer: COMMERCIAL

## 2024-10-04 DIAGNOSIS — E10.69 TYPE 1 DIABETES MELLITUS WITH OTHER SPECIFIED COMPLICATION: ICD-10-CM

## 2024-10-04 RX ORDER — INSULIN LISPRO 100 [IU]/ML
INJECTION, SOLUTION INTRAVENOUS; SUBCUTANEOUS
Qty: 15 ML | Refills: 1 | Status: SHIPPED | OUTPATIENT
Start: 2024-10-04
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
## 2024-10-14 DIAGNOSIS — E03.9 HYPOTHYROIDISM, UNSPECIFIED TYPE: ICD-10-CM

## 2024-10-14 DIAGNOSIS — E10.9 TYPE 1 DIABETES MELLITUS WITHOUT COMPLICATION: ICD-10-CM

## 2024-10-14 RX ORDER — LEVOTHYROXINE SODIUM 200 UG/1
TABLET ORAL
Qty: 90 TABLET | Refills: 0 | Status: SHIPPED | OUTPATIENT
Start: 2024-10-14

## 2024-10-14 RX ORDER — INSULIN GLARGINE 100 [IU]/ML
INJECTION, SOLUTION SUBCUTANEOUS
Qty: 15 ML | Refills: 0 | Status: SHIPPED | OUTPATIENT
Start: 2024-10-14

## 2024-10-25 DIAGNOSIS — E78.5 HYPERLIPIDEMIA, UNSPECIFIED HYPERLIPIDEMIA TYPE: Primary | ICD-10-CM

## 2024-10-25 RX ORDER — ATORVASTATIN CALCIUM 10 MG/1
10 TABLET, FILM COATED ORAL DAILY
Qty: 90 TABLET | Refills: 0 | Status: SHIPPED | OUTPATIENT
Start: 2024-10-25

## 2024-11-11 ENCOUNTER — APPOINTMENT (OUTPATIENT)
Dept: ENDOCRINOLOGY | Facility: CLINIC | Age: 64
End: 2024-11-11
Payer: COMMERCIAL

## 2024-11-11 VITALS
DIASTOLIC BLOOD PRESSURE: 70 MMHG | BODY MASS INDEX: 22.26 KG/M2 | SYSTOLIC BLOOD PRESSURE: 108 MMHG | WEIGHT: 159 LBS | HEIGHT: 71 IN

## 2024-11-11 DIAGNOSIS — E03.9 ACQUIRED HYPOTHYROIDISM: ICD-10-CM

## 2024-11-11 DIAGNOSIS — E10.69 TYPE 1 DIABETES MELLITUS WITH OTHER SPECIFIED COMPLICATION: Primary | ICD-10-CM

## 2024-11-11 DIAGNOSIS — I10 ESSENTIAL HYPERTENSION, BENIGN: ICD-10-CM

## 2024-11-11 LAB
POC FINGERSTICK BLOOD GLUCOSE: 130 MG/DL (ref 70–100)
POC HEMOGLOBIN A1C: 8.2 % (ref 4.2–6.5)

## 2024-11-11 PROCEDURE — 3074F SYST BP LT 130 MM HG: CPT | Performed by: HOSPITALIST

## 2024-11-11 PROCEDURE — 1159F MED LIST DOCD IN RCRD: CPT | Performed by: HOSPITALIST

## 2024-11-11 PROCEDURE — 3048F LDL-C <100 MG/DL: CPT | Performed by: HOSPITALIST

## 2024-11-11 PROCEDURE — 3078F DIAST BP <80 MM HG: CPT | Performed by: HOSPITALIST

## 2024-11-11 PROCEDURE — 1123F ACP DISCUSS/DSCN MKR DOCD: CPT | Performed by: HOSPITALIST

## 2024-11-11 PROCEDURE — 82962 GLUCOSE BLOOD TEST: CPT | Performed by: HOSPITALIST

## 2024-11-11 PROCEDURE — 95251 CONT GLUC MNTR ANALYSIS I&R: CPT | Performed by: HOSPITALIST

## 2024-11-11 PROCEDURE — 83036 HEMOGLOBIN GLYCOSYLATED A1C: CPT | Performed by: HOSPITALIST

## 2024-11-11 PROCEDURE — 99214 OFFICE O/P EST MOD 30 MIN: CPT | Performed by: HOSPITALIST

## 2024-11-11 PROCEDURE — 3008F BODY MASS INDEX DOCD: CPT | Performed by: HOSPITALIST

## 2024-11-11 PROCEDURE — 3046F HEMOGLOBIN A1C LEVEL >9.0%: CPT | Performed by: HOSPITALIST

## 2024-11-11 RX ORDER — GABAPENTIN 100 MG/1
200 CAPSULE ORAL NIGHTLY
Qty: 180 CAPSULE | Refills: 0 | Status: SHIPPED | OUTPATIENT
Start: 2024-11-11 | End: 2025-02-09

## 2024-11-11 ASSESSMENT — ENCOUNTER SYMPTOMS
SORE THROAT: 0
SLEEP DISTURBANCE: 0
AGITATION: 0
FREQUENCY: 0
PALPITATIONS: 0
VOMITING: 0
DIARRHEA: 0
BACK PAIN: 1
CHEST TIGHTNESS: 0
PHOTOPHOBIA: 0
NAUSEA: 0
VOICE CHANGE: 0
HEADACHES: 0
CONSTIPATION: 0
TROUBLE SWALLOWING: 0
DYSURIA: 0
ABDOMINAL DISTENTION: 0
ABDOMINAL PAIN: 0
ARTHRALGIAS: 0
NERVOUS/ANXIOUS: 0
CONSTITUTIONAL NEGATIVE: 1
EYE ITCHING: 0
SHORTNESS OF BREATH: 0
TREMORS: 0
NUMBNESS: 1
LIGHT-HEADEDNESS: 0

## 2024-11-11 NOTE — PROGRESS NOTES
Subjective   Patient ID: Van Feliz is a 64 y.o. male who presents for Diabetes ((Dx: 2020/PCP: Dr. Rodarte /Podiatry: does not see one /Eye exam: yearly,6/2024 /Patient testing glucose 4 times daily with dexcom G7- phone as reader. /Pt adhering and benefiting from cgm treatment. /7/18/24 hga1c 7.6%) and Hypothyroidism (Current regimen: levothyroxine 200mcg + 100mcg daily// Follow PCP )   Lab Results   Component Value Date    HGBA1C 8.2 (A) 11/11/2024      HPI   See AP     Review of Systems   Constitutional: Negative.    HENT:  Negative for sore throat, trouble swallowing and voice change.    Eyes:  Negative for photophobia, itching and visual disturbance.   Respiratory:  Negative for chest tightness and shortness of breath.    Cardiovascular:  Negative for chest pain and palpitations.   Gastrointestinal:  Negative for abdominal distention, abdominal pain, constipation, diarrhea, nausea and vomiting.   Endocrine: Negative for cold intolerance, heat intolerance and polyuria.   Genitourinary:  Negative for dysuria and frequency.   Musculoskeletal:  Positive for back pain. Negative for arthralgias.   Skin:  Negative for pallor.   Allergic/Immunologic: Negative for environmental allergies.   Neurological:  Positive for numbness. Negative for tremors, light-headedness and headaches.   Psychiatric/Behavioral:  Negative for agitation and sleep disturbance. The patient is not nervous/anxious.        Objective   Physical Exam  Constitutional:       Appearance: Normal appearance.   HENT:      Head: Normocephalic.      Nose: Nose normal.      Mouth/Throat:      Mouth: Mucous membranes are moist.   Eyes:      Extraocular Movements: Extraocular movements intact.   Cardiovascular:      Rate and Rhythm: Normal rate.   Pulmonary:      Effort: Pulmonary effort is normal. No respiratory distress.   Abdominal:      General: There is no distension.   Musculoskeletal:         General: Normal range of motion.      Cervical back:  "Normal range of motion and neck supple.   Skin:     General: Skin is warm and dry.   Neurological:      Mental Status: He is alert and oriented to person, place, and time.   Psychiatric:         Mood and Affect: Mood normal.      Visit Vitals  /70   Ht 1.803 m (5' 11\")   Wt 72.1 kg (159 lb)   BMI 22.18 kg/m²   Smoking Status Every Day   BSA 1.9 m²        Assessment/Plan   Diagnoses and all orders for this visit:  Type 1 diabetes mellitus with other specified complication  -     POCT glycosylated hemoglobin (Hb A1C) manually resulted  -     POCT glucose manually resulted  -     gabapentin (Neurontin) 100 mg capsule; Take 2 capsules (200 mg) by mouth once daily at bedtime.  Acquired hypothyroidism  Essential hypertension, benign              DM: was being managed like T 2 DM but lasb showed positive Rosy 64 with low c peptide    given controlled DM since Dx will manage as T1 DM  dx dm: 2 years ago , initially managed as type 2 DM , but he was mis dx, he is typ1 DM      Current regimen:( started basal bolus march 16. 2023)   - lantus 18-0-0-0 ( paying 85 $ per month )   - humalog 5-5-5-0 , and SSI 1: 50 > 150      metformin and glimepiride stopped in past.      He has a Dexcom G7.  Downloaded, interpreted  Active time 86%.  Time in range 45% low sugar 2% ( it was 8 % last times)    Interval history: 5/10/2024 went to ER due to lightheadedness.  Hypoglycemic episode in March 2024 requiring hospitalization.     Dexcom CGM was costing 750 $ in past  Diet - does drink juice occ, pop in past , now only diet pop,     PLAN:   -continue lantus 18-0-0-0  -Change Humalog 4-5-6-0. SSI 1: 50 > 150,   -In past discussed 15 g complex carb before going to airport    - Discussed diabetic diet and diet modifications as a means to control diabetes, advised 45- 60g carb meals and 15 g snacks   - given he is now a Typ 1 DM and has to be on 4 injections a day continue CGM use.   Will discuss insulin pump in future         on statin, " LDL at goal , repeat  ,not on ACei/ ARB, cannot start it given his low blood pressures readings.  Dicussed hypoglycemia management. Sent GVOKE pen again      # hypothyroidism :  6/ 2024 TSH 22.  Dose was changed by primary care physician.    No recent TFT   Follow PCP      # Unintentional weight loss:   He mentions he has progressively lost 40 pounds over the 1 or 2 years.   Now stable   He mentions he has lower appetite.  No other GI symptoms.  Unclear cause.  His daughter has celiac disease.  Will check for celiac, will check TFT, PSA, cortisol  He mentions he is up-to-date with colonoscopy.  Also follow-up with Dr. Rodarte   Has not done any blood work      # DN : b/l feet neuropathy - will try gabapentin 200 mg at bedtime. Discussed if any dizziness or drowsiness then stop it    RTC      SH- lives in [place].   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    travels a lot for work - every other week

## 2024-11-11 NOTE — PROGRESS NOTES
Subjective   Patient ID: Van Feliz is a 64 y.o. male who presents for Diabetes ((Dx: 2020/PCP: Dr. Rodarte /Podiatry: does not see one /Eye exam: yearly,6/2024 /Patient testing glucose 4 times daily with dexcom G7- phone as reader. /Pt adhering and benefiting from cgm treatment. /7/18/24 hga1c 7.6%) and Hypothyroidism (Current regimen: levothyroxine 200mcg + 100mcg daily// Follow PCP ).    HPI    Review of Systems    Objective   Physical Exam    Assessment/Plan            Apolinar Pinzon CMA 11/11/24 12:17 PM

## 2024-11-12 ENCOUNTER — OFFICE VISIT (OUTPATIENT)
Dept: PRIMARY CARE | Facility: CLINIC | Age: 64
End: 2024-11-12
Payer: COMMERCIAL

## 2024-11-12 VITALS
SYSTOLIC BLOOD PRESSURE: 130 MMHG | DIASTOLIC BLOOD PRESSURE: 70 MMHG | BODY MASS INDEX: 22.82 KG/M2 | OXYGEN SATURATION: 99 % | WEIGHT: 163 LBS | RESPIRATION RATE: 14 BRPM | HEART RATE: 95 BPM | HEIGHT: 71 IN | TEMPERATURE: 98.5 F

## 2024-11-12 DIAGNOSIS — E10.40: ICD-10-CM

## 2024-11-12 DIAGNOSIS — F51.01 PRIMARY INSOMNIA: ICD-10-CM

## 2024-11-12 DIAGNOSIS — E10.9 TYPE 1 DIABETES MELLITUS WITHOUT COMPLICATION: ICD-10-CM

## 2024-11-12 DIAGNOSIS — E03.9 ACQUIRED HYPOTHYROIDISM: ICD-10-CM

## 2024-11-12 DIAGNOSIS — I10 ESSENTIAL HYPERTENSION, BENIGN: Primary | ICD-10-CM

## 2024-11-12 DIAGNOSIS — R53.83 OTHER FATIGUE: ICD-10-CM

## 2024-11-12 DIAGNOSIS — I73.00 RAYNAUD'S DISEASE WITHOUT GANGRENE: ICD-10-CM

## 2024-11-12 PROCEDURE — 99214 OFFICE O/P EST MOD 30 MIN: CPT | Performed by: INTERNAL MEDICINE

## 2024-11-12 PROCEDURE — 1159F MED LIST DOCD IN RCRD: CPT | Performed by: INTERNAL MEDICINE

## 2024-11-12 PROCEDURE — 1160F RVW MEDS BY RX/DR IN RCRD: CPT | Performed by: INTERNAL MEDICINE

## 2024-11-12 PROCEDURE — 3075F SYST BP GE 130 - 139MM HG: CPT | Performed by: INTERNAL MEDICINE

## 2024-11-12 PROCEDURE — 1123F ACP DISCUSS/DSCN MKR DOCD: CPT | Performed by: INTERNAL MEDICINE

## 2024-11-12 PROCEDURE — 3048F LDL-C <100 MG/DL: CPT | Performed by: INTERNAL MEDICINE

## 2024-11-12 PROCEDURE — 1158F ADVNC CARE PLAN TLK DOCD: CPT | Performed by: INTERNAL MEDICINE

## 2024-11-12 PROCEDURE — 3078F DIAST BP <80 MM HG: CPT | Performed by: INTERNAL MEDICINE

## 2024-11-12 PROCEDURE — 3008F BODY MASS INDEX DOCD: CPT | Performed by: INTERNAL MEDICINE

## 2024-11-12 PROCEDURE — 3046F HEMOGLOBIN A1C LEVEL >9.0%: CPT | Performed by: INTERNAL MEDICINE

## 2024-11-12 RX ORDER — DOXEPIN HYDROCHLORIDE 10 MG/1
30 CAPSULE ORAL NIGHTLY
Qty: 90 CAPSULE | Refills: 11 | Status: SHIPPED | OUTPATIENT
Start: 2024-11-12 | End: 2025-11-12

## 2024-11-12 ASSESSMENT — ENCOUNTER SYMPTOMS
CONSTIPATION: 0
PALPITATIONS: 0
NAUSEA: 0
WHEEZING: 0
DIARRHEA: 0
COUGH: 0
SHORTNESS OF BREATH: 0

## 2024-11-12 NOTE — PROGRESS NOTES
"Subjective   Patient ID: Van Feliz is a 64 y.o. male who presents for Hypertension.    Complains of fatigue.  Has issues with dizzy spells.  Rarely when he stands, but when he gets low blood sugars.  He does follow with Endo for DM.    He admits to poor sleep.  Maybe 6 hours of sleep, but it is broken sleep too.    No issues with CP or SOB.     Review of Systems   Respiratory:  Negative for cough, shortness of breath and wheezing.    Cardiovascular:  Negative for chest pain and palpitations.   Gastrointestinal:  Negative for constipation, diarrhea and nausea.     Objective   /70 (BP Location: Left arm, Patient Position: Sitting, BP Cuff Size: Adult)   Pulse 95   Temp 36.9 °C (98.5 °F) (Tympanic)   Resp 14   Ht 1.803 m (5' 11\")   Wt 73.9 kg (163 lb)   SpO2 99%   BMI 22.73 kg/m²     Physical Exam  Vitals reviewed.   Constitutional:       Appearance: Normal appearance.   HENT:      Head: Normocephalic.   Eyes:      Pupils: Pupils are equal, round, and reactive to light.   Cardiovascular:      Rate and Rhythm: Normal rate and regular rhythm.   Pulmonary:      Effort: Pulmonary effort is normal.      Breath sounds: Normal breath sounds.   Musculoskeletal:         General: Normal range of motion.   Neurological:      General: No focal deficit present.      Mental Status: He is alert.   Psychiatric:         Mood and Affect: Mood normal.       Assessment/Plan   Problem List Items Addressed This Visit             ICD-10-CM    Essential hypertension, benign - Primary I10    Acquired hypothyroidism E03.9    Type 1 diabetes mellitus without complication E10.9     Other Visit Diagnoses         Codes    Neuropathy due to type 1 diabetes mellitus (Multi)     E10.40    Raynaud's disease without gangrene     I73.00    Other fatigue     R53.83    Primary insomnia     F51.01    Relevant Medications    doxepin (SINEquan) 10 mg capsule        HTN controlled.  No changes due to occasional dizzy spells.  Stressed need to " increase fluids through the day.    He describes Raynauds of his fingers - they turn white when it gets cold.  We discussed need to keep fingers/hands warm.    Discussed his fatigue.  We need him to get his blood tests. We also discussed his sleep.  Stressed need for 7-9 hours of sleep every night.  We discussed doxepin and he is agreeable to trying.    Follow up in 6 months - sooner if any issues.

## 2024-11-13 ENCOUNTER — LAB (OUTPATIENT)
Dept: LAB | Facility: LAB | Age: 64
End: 2024-11-13
Payer: COMMERCIAL

## 2024-11-13 DIAGNOSIS — E03.9 HYPOTHYROIDISM, UNSPECIFIED TYPE: ICD-10-CM

## 2024-11-13 DIAGNOSIS — R63.4 WEIGHT LOSS, UNINTENTIONAL: ICD-10-CM

## 2024-11-13 DIAGNOSIS — Z00.00 PERIODIC HEALTH ASSESSMENT, GENERAL SCREENING, ADULT: ICD-10-CM

## 2024-11-13 DIAGNOSIS — E87.1 HYPONATREMIA: ICD-10-CM

## 2024-11-13 DIAGNOSIS — E10.69 TYPE 1 DIABETES MELLITUS WITH OTHER SPECIFIED COMPLICATION: ICD-10-CM

## 2024-11-13 LAB
ALBUMIN SERPL BCP-MCNC: 4.1 G/DL (ref 3.4–5)
ALP SERPL-CCNC: 64 U/L (ref 33–136)
ALT SERPL W P-5'-P-CCNC: 22 U/L (ref 10–52)
ANION GAP SERPL CALC-SCNC: 9 MMOL/L (ref 10–20)
AST SERPL W P-5'-P-CCNC: 22 U/L (ref 9–39)
BILIRUB SERPL-MCNC: 0.7 MG/DL (ref 0–1.2)
BUN SERPL-MCNC: 13 MG/DL (ref 6–23)
CALCIUM SERPL-MCNC: 9.1 MG/DL (ref 8.6–10.3)
CHLORIDE SERPL-SCNC: 98 MMOL/L (ref 98–107)
CHOLEST SERPL-MCNC: 175 MG/DL (ref 0–199)
CHOLESTEROL/HDL RATIO: 2
CO2 SERPL-SCNC: 32 MMOL/L (ref 21–32)
CORTIS SERPL-MCNC: 18.8 UG/DL (ref 2.5–20)
CREAT SERPL-MCNC: 0.93 MG/DL (ref 0.5–1.3)
EGFRCR SERPLBLD CKD-EPI 2021: >90 ML/MIN/1.73M*2
ERYTHROCYTE [DISTWIDTH] IN BLOOD BY AUTOMATED COUNT: 11.8 % (ref 11.5–14.5)
GLUCOSE SERPL-MCNC: 158 MG/DL (ref 74–99)
HCT VFR BLD AUTO: 42.3 % (ref 41–52)
HDLC SERPL-MCNC: 87.5 MG/DL
HGB BLD-MCNC: 15 G/DL (ref 13.5–17.5)
LDLC SERPL CALC-MCNC: 77 MG/DL
MCH RBC QN AUTO: 33.4 PG (ref 26–34)
MCHC RBC AUTO-ENTMCNC: 35.5 G/DL (ref 32–36)
MCV RBC AUTO: 94 FL (ref 80–100)
NON HDL CHOLESTEROL: 88 MG/DL (ref 0–149)
NRBC BLD-RTO: 0 /100 WBCS (ref 0–0)
PLATELET # BLD AUTO: 213 X10*3/UL (ref 150–450)
POTASSIUM SERPL-SCNC: 5.3 MMOL/L (ref 3.5–5.3)
PROT SERPL-MCNC: 6.4 G/DL (ref 6.4–8.2)
PSA SERPL-MCNC: 0.3 NG/ML
RBC # BLD AUTO: 4.49 X10*6/UL (ref 4.5–5.9)
SODIUM SERPL-SCNC: 134 MMOL/L (ref 136–145)
T4 FREE SERPL-MCNC: 0.85 NG/DL (ref 0.61–1.12)
TRIGL SERPL-MCNC: 51 MG/DL (ref 0–149)
TSH SERPL-ACNC: 33.01 MIU/L (ref 0.44–3.98)
TTG IGA SER IA-ACNC: <1 U/ML
VLDL: 10 MG/DL (ref 0–40)
WBC # BLD AUTO: 7.5 X10*3/UL (ref 4.4–11.3)

## 2024-11-13 PROCEDURE — 84153 ASSAY OF PSA TOTAL: CPT

## 2024-11-13 PROCEDURE — 83516 IMMUNOASSAY NONANTIBODY: CPT

## 2024-11-13 PROCEDURE — 84439 ASSAY OF FREE THYROXINE: CPT

## 2024-11-13 PROCEDURE — 82533 TOTAL CORTISOL: CPT

## 2024-11-13 PROCEDURE — 36415 COLL VENOUS BLD VENIPUNCTURE: CPT

## 2024-11-13 PROCEDURE — 84443 ASSAY THYROID STIM HORMONE: CPT

## 2024-11-13 PROCEDURE — 85027 COMPLETE CBC AUTOMATED: CPT

## 2024-11-13 PROCEDURE — 80053 COMPREHEN METABOLIC PANEL: CPT

## 2024-11-13 PROCEDURE — 80061 LIPID PANEL: CPT

## 2024-11-14 RX ORDER — LEVOTHYROXINE SODIUM 200 UG/1
TABLET ORAL
Qty: 90 TABLET | Refills: 0 | Status: SHIPPED | OUTPATIENT
Start: 2024-11-14

## 2024-11-15 LAB — TTG IGG SER IA-ACNC: <0.82 FLU (ref 0–4.99)

## 2024-11-18 DIAGNOSIS — E03.9 ACQUIRED HYPOTHYROIDISM: Primary | ICD-10-CM

## 2024-11-18 RX ORDER — LEVOTHYROXINE SODIUM 25 UG/1
25 TABLET ORAL DAILY
Qty: 30 TABLET | Refills: 11 | Status: SHIPPED | OUTPATIENT
Start: 2024-11-18 | End: 2025-11-18

## 2024-12-23 DIAGNOSIS — E10.9 TYPE 1 DIABETES MELLITUS WITHOUT COMPLICATION: ICD-10-CM

## 2024-12-23 RX ORDER — BLOOD-GLUCOSE SENSOR
EACH MISCELLANEOUS
Qty: 6 EACH | Refills: 0 | Status: SHIPPED | OUTPATIENT
Start: 2024-12-23

## 2025-01-13 DIAGNOSIS — E10.9 TYPE 1 DIABETES MELLITUS WITHOUT COMPLICATION: ICD-10-CM

## 2025-01-14 RX ORDER — INSULIN GLARGINE 100 [IU]/ML
INJECTION, SOLUTION SUBCUTANEOUS
Qty: 30 ML | Refills: 1 | Status: SHIPPED | OUTPATIENT
Start: 2025-01-14

## 2025-02-05 DIAGNOSIS — E03.9 HYPOTHYROIDISM, UNSPECIFIED TYPE: ICD-10-CM

## 2025-02-06 RX ORDER — LEVOTHYROXINE SODIUM 200 UG/1
TABLET ORAL
Qty: 90 TABLET | Refills: 0 | Status: SHIPPED | OUTPATIENT
Start: 2025-02-06

## 2025-02-23 DIAGNOSIS — E10.9 TYPE 1 DIABETES MELLITUS WITHOUT COMPLICATION: ICD-10-CM

## 2025-02-24 RX ORDER — BLOOD-GLUCOSE SENSOR
EACH MISCELLANEOUS
Qty: 6 EACH | Refills: 0 | Status: SHIPPED | OUTPATIENT
Start: 2025-02-24

## 2025-03-19 ENCOUNTER — APPOINTMENT (OUTPATIENT)
Dept: ENDOCRINOLOGY | Facility: CLINIC | Age: 65
End: 2025-03-19
Payer: COMMERCIAL

## 2025-03-26 ENCOUNTER — APPOINTMENT (OUTPATIENT)
Dept: ENDOCRINOLOGY | Facility: CLINIC | Age: 65
End: 2025-03-26
Payer: COMMERCIAL

## 2025-03-26 VITALS
DIASTOLIC BLOOD PRESSURE: 60 MMHG | WEIGHT: 171 LBS | BODY MASS INDEX: 23.16 KG/M2 | SYSTOLIC BLOOD PRESSURE: 112 MMHG | HEIGHT: 72 IN

## 2025-03-26 DIAGNOSIS — E10.69 TYPE 1 DIABETES MELLITUS WITH OTHER SPECIFIED COMPLICATION: Primary | ICD-10-CM

## 2025-03-26 DIAGNOSIS — I10 ESSENTIAL HYPERTENSION, BENIGN: ICD-10-CM

## 2025-03-26 DIAGNOSIS — E03.9 ACQUIRED HYPOTHYROIDISM: ICD-10-CM

## 2025-03-26 LAB
POC FINGERSTICK BLOOD GLUCOSE: 160 MG/DL (ref 70–100)
POC HEMOGLOBIN A1C: 9.2 % (ref 4.2–6.5)

## 2025-03-26 PROCEDURE — 83036 HEMOGLOBIN GLYCOSYLATED A1C: CPT | Performed by: HOSPITALIST

## 2025-03-26 PROCEDURE — 1123F ACP DISCUSS/DSCN MKR DOCD: CPT | Performed by: HOSPITALIST

## 2025-03-26 PROCEDURE — 3074F SYST BP LT 130 MM HG: CPT | Performed by: HOSPITALIST

## 2025-03-26 PROCEDURE — 1159F MED LIST DOCD IN RCRD: CPT | Performed by: HOSPITALIST

## 2025-03-26 PROCEDURE — 82962 GLUCOSE BLOOD TEST: CPT | Performed by: HOSPITALIST

## 2025-03-26 PROCEDURE — 3008F BODY MASS INDEX DOCD: CPT | Performed by: HOSPITALIST

## 2025-03-26 PROCEDURE — 99214 OFFICE O/P EST MOD 30 MIN: CPT | Performed by: HOSPITALIST

## 2025-03-26 PROCEDURE — G0108 DIAB MANAGE TRN  PER INDIV: HCPCS | Performed by: HOSPITALIST

## 2025-03-26 PROCEDURE — 95251 CONT GLUC MNTR ANALYSIS I&R: CPT | Performed by: HOSPITALIST

## 2025-03-26 PROCEDURE — 3078F DIAST BP <80 MM HG: CPT | Performed by: HOSPITALIST

## 2025-03-26 ASSESSMENT — ENCOUNTER SYMPTOMS
SORE THROAT: 0
TROUBLE SWALLOWING: 0
NAUSEA: 0
DIARRHEA: 0
CONSTITUTIONAL NEGATIVE: 1
CHEST TIGHTNESS: 0
FREQUENCY: 0
ARTHRALGIAS: 0
LIGHT-HEADEDNESS: 0
AGITATION: 0
NERVOUS/ANXIOUS: 0
SHORTNESS OF BREATH: 0
ABDOMINAL DISTENTION: 0
HEADACHES: 0
VOMITING: 0
EYE ITCHING: 0
PHOTOPHOBIA: 0
DYSURIA: 0
SLEEP DISTURBANCE: 0
TREMORS: 0
CONSTIPATION: 0
VOICE CHANGE: 0
ABDOMINAL PAIN: 0
PALPITATIONS: 0
BACK PAIN: 1

## 2025-03-26 NOTE — PATIENT INSTRUCTIONS
Increase LANTUS TO 19 UNITS    INCREASE HUMALOG TO 5 UNITS BEFORE BKFST, 5 UNITS LUNCH AND 7 UNITS DINNER WITH SLIDING SCALE INSULIN

## 2025-03-26 NOTE — PROGRESS NOTES
Met with patient to discuss introduction to  insulin pump therapy , as an option in his diabetic plan.    Patient admits to be reluctant to change, and is not interested in carbohydrate counting.   Having issues with trying to keep his diabetes unknown to others and does not like to give insulin while at work or when he is out.  Does not want devices to be visible.   Patient notified how most pumps have phone apps to enable him to operate them.    Provided patient general information on advances of using insulin pump therapy over MDI.  Patient is not sure he wants to wear 2 items on his body.  Has limited time to discuss different pump options, and just keeps says he is not going to carbohydrate count.  He is aware that all pumps are hybid pumps, that do need some input on meals.   Requesting additional information to be sent to him at his home address to take his time to review each pump option.   Provided patient my contact information.     Patient is currently in Comtemplation stage for options in therapy.    Provider in office today:   Florencia Thomas RN, BSN, [facility]

## 2025-03-26 NOTE — PROGRESS NOTES
Subjective   Patient ID: Van Feliz is a 64 y.o. male who presents for Diabetes (((Dx: 2020/PCP: Dr. Rodarte /Podiatry: does not see one /Eye exam: yearly/Patient testing glucose 4 times daily with dexcom G7. /Pt adhering and benefiting from cgm treatment. Compliant with diabetic regime. Pt adhering and  and brnefitung from CGM treatment plan. Hypothyroidism  pt sates taking levothyroxine 200 mcg every day and 200mcg  with 1/2 tab of 200 on Sat and Sun).   Lab Results   Component Value Date    HGBA1C 9.2 (A) 03/26/2025      HPI   See AP     Review of Systems   Constitutional: Negative.    HENT:  Negative for sore throat, trouble swallowing and voice change.    Eyes:  Negative for photophobia, itching and visual disturbance.   Respiratory:  Negative for chest tightness and shortness of breath.    Cardiovascular:  Negative for chest pain and palpitations.   Gastrointestinal:  Negative for abdominal distention, abdominal pain, constipation, diarrhea, nausea and vomiting.   Endocrine: Negative for cold intolerance, heat intolerance and polyuria.   Genitourinary:  Negative for dysuria and frequency.        Night sweats    Musculoskeletal:  Positive for back pain. Negative for arthralgias.   Skin:  Negative for pallor.   Allergic/Immunologic: Negative for environmental allergies.   Neurological:  Negative for tremors, light-headedness and headaches.   Psychiatric/Behavioral:  Negative for agitation and sleep disturbance. The patient is not nervous/anxious.        Objective   Physical Exam  Constitutional:       Appearance: Normal appearance.   HENT:      Head: Normocephalic.      Nose: Nose normal.      Mouth/Throat:      Mouth: Mucous membranes are moist.   Eyes:      Extraocular Movements: Extraocular movements intact.   Cardiovascular:      Rate and Rhythm: Normal rate.   Pulmonary:      Effort: Pulmonary effort is normal. No respiratory distress.   Abdominal:      General: There is no distension.    Musculoskeletal:         General: Normal range of motion.      Cervical back: Normal range of motion and neck supple.   Skin:     General: Skin is warm and dry.   Neurological:      Mental Status: He is alert and oriented to person, place, and time.   Psychiatric:         Mood and Affect: Mood normal.      Visit Vitals  /60   Ht 1.829 m (6')   Wt 77.6 kg (171 lb)   BMI 23.19 kg/m²   Smoking Status Every Day   BSA 1.99 m²        Assessment/Plan   Diagnoses and all orders for this visit:  Type 1 diabetes mellitus with other specified complication  -     POCT glycosylated hemoglobin (Hb A1C) manually resulted  -     POCT fingerstick glucose manually resulted  -     Comprehensive metabolic panel; Future  -     TSH with reflex to Free T4 if abnormal; Future  -     Albumin-Creatinine Ratio, Urine Random; Future  Acquired hypothyroidism  Essential hypertension, benign              DM: was being managed like T 2 DM but lasb showed positive Rosy 64 with low c peptide    given controlled DM since Dx will manage as T1 DM  dx dm: 2 years ago , initially managed as type 2 DM , but he was mis dx, he is typ1 DM      Current regimen:( started basal bolus march 16. 2023)   - lantus 18-0-0-0 ( paying 85 $ per month )   - humalog 5-5-5-0 , and SSI 1: 50 > 150      metformin and glimepiride stopped in past.      He has a Dexcom G7.  Downloaded, interpreted  Active time 76%.  Time in range 13% low sugar < 1% ( it was 8 % in past)    Interval history: 5/10/2024 went to ER due to lightheadedness.  Hypoglycemic episode in March 2024 requiring hospitalization.     Dexcom CGM was costing 750 $ in past  Diet - does drink juice occ, pop in past , now only diet pop,     PLAN:   Increase LANTUS TO 19 UNITS    INCREASE HUMALOG TO 5 UNITS BEFORE BKFST, 5 UNITS LUNCH AND 7 UNITS DINNER WITH SLIDING SCALE INSULIN   -In past discussed 15 g complex carb before going to airport    - in past Discussed diabetic diet and diet modifications as a  means to control diabetes, advised 45- 60g carb meals and 15 g snacks   - given he is now a Typ 1 DM and has to be on 4 injections a day continue CGM use.  today we discussed  insulin pumps        on statin, LDL at goal  ,not on ACei/ ARB, cannot start it given his low blood pressures readings.  Dicussed hypoglycemia management. Sent GVOKE pen again      # hypothyroidism :  6/ 2024 TSH 22.  Dose was changed by primary care physician.     11/ 2024 TSH 33 dose was increased to 225 daily   No recent TFT   Follow PCP , ordered repeat one      # Unintentional weight loss:   He mentions he has progressively lost 40 pounds over the 1 or 2 years.   Now stable   C/o night sweats    Denies hypoglycemia  at that time   Smokes cigars for many yrs    May need CT  lung cancer screening   He mentions he is up-to-date with colonoscopy.   If continue have symptoms may need testosterone level checked   Also follow-up with Dr. Rodarte       # DN : b/l feet neuropathy - podiatry follow up      Spent time  with sarah today for DE , introduction to insulin pumps     RTC      SH- lives in [place].   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    travels a lot for work - every other week

## 2025-04-03 ENCOUNTER — TELEPHONE (OUTPATIENT)
Age: 65
End: 2025-04-03
Payer: COMMERCIAL

## 2025-04-03 NOTE — LETTER
April 3, 2025     Van Feliz    Patient: Van Feliz   YOB: 1960   Date of Visit: 4/3/2025     Dear Dr. Van Feliz:    We met during your appointment with Dr. Encinas on March 26, 2025.   I have enclosed information on several different insulin pump systems.       1.Omnipod 5 pump is a the only tubeless system and it works with Dexcom continuous glucose monitor.    2. Tandem has 2 different insulin pumps:  t-slim X-2 and Mobic.   Both work Dexcom or Zena continuous glucose monitors.   Mobic is a very small pump that can be adhered to your skin with a very short tubing to make it almost tubeless.   Mobic pump is only compatible with IOS phones.   Tandem insulin pump is the only automated pump that will allow you to take your meal time insulin over extended period of time.      3. Beta Bionics pump is the only pump that does not require any carbohydrate counting.  It does require you to indicate to the pump if your eating small, medium or large meals.  It does work with Dexcom or Zena continuous glucose monitors.  This pump is compatible with both android and IOS phones.       Please review this information. Do not hesitate to call me at [phone], if you have any questions or would like to see them.       Sincerely,       Florencia Thomas RN, BSN, [facility]  Clinical Coordinator Diabetes Lead  [facility]   [facility]  [email or web address]  office: [phone]           ______________________________________________________________________________________    Letter sent to patient with information on Omnipod 5, Tandem pumps and Beta Bionics insulin pumps.      Florencia Thomas RN, BSN, [facility]

## 2025-04-03 NOTE — TELEPHONE ENCOUNTER
Letter sent to patient with information on Omnipod 5, Tandem pumps and Beta Bionics insulin pumps.      Florencia Thomas RN, BSN, [facility]

## 2025-05-09 DIAGNOSIS — E03.9 HYPOTHYROIDISM, UNSPECIFIED TYPE: ICD-10-CM

## 2025-05-09 RX ORDER — LEVOTHYROXINE SODIUM 200 UG/1
TABLET ORAL
Qty: 90 TABLET | Refills: 0 | Status: SHIPPED | OUTPATIENT
Start: 2025-05-09

## 2025-05-13 ENCOUNTER — APPOINTMENT (OUTPATIENT)
Dept: PRIMARY CARE | Facility: CLINIC | Age: 65
End: 2025-05-13
Payer: COMMERCIAL

## 2025-05-13 VITALS
OXYGEN SATURATION: 99 % | RESPIRATION RATE: 14 BRPM | HEIGHT: 72 IN | TEMPERATURE: 97.6 F | WEIGHT: 178 LBS | BODY MASS INDEX: 24.11 KG/M2 | HEART RATE: 68 BPM | SYSTOLIC BLOOD PRESSURE: 124 MMHG | DIASTOLIC BLOOD PRESSURE: 70 MMHG

## 2025-05-13 DIAGNOSIS — E10.9 TYPE 1 DIABETES MELLITUS WITHOUT COMPLICATION: ICD-10-CM

## 2025-05-13 DIAGNOSIS — G89.29 CHRONIC BILATERAL LOW BACK PAIN WITHOUT SCIATICA: Primary | ICD-10-CM

## 2025-05-13 DIAGNOSIS — M54.50 CHRONIC BILATERAL LOW BACK PAIN WITHOUT SCIATICA: Primary | ICD-10-CM

## 2025-05-13 DIAGNOSIS — C76.0 MALIGNANT NEOPLASM OF HEAD, FACE AND NECK (MULTI): ICD-10-CM

## 2025-05-13 DIAGNOSIS — E03.9 ACQUIRED HYPOTHYROIDISM: ICD-10-CM

## 2025-05-13 DIAGNOSIS — F51.01 PRIMARY INSOMNIA: ICD-10-CM

## 2025-05-13 DIAGNOSIS — I10 ESSENTIAL HYPERTENSION, BENIGN: ICD-10-CM

## 2025-05-13 PROCEDURE — 1159F MED LIST DOCD IN RCRD: CPT | Performed by: INTERNAL MEDICINE

## 2025-05-13 PROCEDURE — 3008F BODY MASS INDEX DOCD: CPT | Performed by: INTERNAL MEDICINE

## 2025-05-13 PROCEDURE — 3074F SYST BP LT 130 MM HG: CPT | Performed by: INTERNAL MEDICINE

## 2025-05-13 PROCEDURE — 1158F ADVNC CARE PLAN TLK DOCD: CPT | Performed by: INTERNAL MEDICINE

## 2025-05-13 PROCEDURE — 3046F HEMOGLOBIN A1C LEVEL >9.0%: CPT | Performed by: INTERNAL MEDICINE

## 2025-05-13 PROCEDURE — 1160F RVW MEDS BY RX/DR IN RCRD: CPT | Performed by: INTERNAL MEDICINE

## 2025-05-13 PROCEDURE — 3078F DIAST BP <80 MM HG: CPT | Performed by: INTERNAL MEDICINE

## 2025-05-13 PROCEDURE — 99214 OFFICE O/P EST MOD 30 MIN: CPT | Performed by: INTERNAL MEDICINE

## 2025-05-13 ASSESSMENT — ENCOUNTER SYMPTOMS
COUGH: 1
WHEEZING: 0
HYPERTENSION: 1
PALPITATIONS: 0
SHORTNESS OF BREATH: 0
NAUSEA: 0
ABDOMINAL PAIN: 0
CONSTIPATION: 0
DIARRHEA: 0

## 2025-05-13 ASSESSMENT — PATIENT HEALTH QUESTIONNAIRE - PHQ9
2. FEELING DOWN, DEPRESSED OR HOPELESS: NOT AT ALL
1. LITTLE INTEREST OR PLEASURE IN DOING THINGS: NOT AT ALL
SUM OF ALL RESPONSES TO PHQ9 QUESTIONS 1 AND 2: 0

## 2025-05-13 NOTE — PROGRESS NOTES
Subjective   Patient ID: Van Feliz is a 64 y.o. male who presents for Hypertension.    Hypertension  Pertinent negatives include no chest pain, palpitations or shortness of breath.        Review of Systems   HENT:  Positive for postnasal drip.    Respiratory:  Positive for cough. Negative for shortness of breath and wheezing.         X 7days   Cardiovascular:  Negative for chest pain and palpitations.   Gastrointestinal:  Negative for abdominal pain, constipation, diarrhea and nausea.       Objective   /70 (BP Location: Left arm, Patient Position: Sitting, BP Cuff Size: Adult)   Pulse 68   Temp 36.4 °C (97.6 °F) (Tympanic)   Resp 14   Ht 1.829 m (6')   Wt 80.7 kg (178 lb)   SpO2 99%   BMI 24.14 kg/m²     Physical Exam  Vitals reviewed.   Constitutional:       Appearance: Normal appearance.   HENT:      Head: Normocephalic.   Cardiovascular:      Rate and Rhythm: Normal rate.   Pulmonary:      Effort: Pulmonary effort is normal.   Musculoskeletal:         General: Normal range of motion.   Neurological:      General: No focal deficit present.      Mental Status: He is alert.   Psychiatric:         Mood and Affect: Mood normal.         Assessment/Plan   Problem List Items Addressed This Visit           ICD-10-CM    Essential hypertension, benign I10    Acquired hypothyroidism E03.9    Type 1 diabetes mellitus without complication E10.9    Relevant Orders    Referral to Clinical Pharmacy    Malignant neoplasm of head, face and neck (Multi) C76.0     Other Visit Diagnoses         Codes      Chronic bilateral low back pain without sciatica    -  Primary M54.50, G89.29    Relevant Orders    XR lumbar spine 2-3 views      Primary insomnia     F51.01        Discussed the above.  Hypertension is controlled.  His thyroid and diabetes are followed by endocrinology.  Chronic low back pain.  Due to the chronicity of it we will proceed with x-rays to assess osseous structures.  We did discuss physical therapy as  well.  For sleep related difficulties we did discuss sleep hygiene.  Follow-up in 6 months, sooner if any issues

## 2025-05-31 DIAGNOSIS — E78.5 HYPERLIPIDEMIA, UNSPECIFIED HYPERLIPIDEMIA TYPE: ICD-10-CM

## 2025-06-02 RX ORDER — ATORVASTATIN CALCIUM 10 MG/1
10 TABLET, FILM COATED ORAL DAILY
Qty: 90 TABLET | Refills: 0 | Status: SHIPPED | OUTPATIENT
Start: 2025-06-02

## 2025-06-03 ENCOUNTER — APPOINTMENT (OUTPATIENT)
Dept: PHARMACY | Facility: HOSPITAL | Age: 65
End: 2025-06-03
Payer: COMMERCIAL

## 2025-06-12 ENCOUNTER — APPOINTMENT (OUTPATIENT)
Dept: PHARMACY | Facility: HOSPITAL | Age: 65
End: 2025-06-12
Payer: COMMERCIAL

## 2025-06-12 DIAGNOSIS — E10.9 TYPE 1 DIABETES MELLITUS WITHOUT COMPLICATION: ICD-10-CM

## 2025-06-12 PROCEDURE — RXMED WILLOW AMBULATORY MEDICATION CHARGE

## 2025-06-12 RX ORDER — BLOOD-GLUCOSE SENSOR
EACH MISCELLANEOUS
Qty: 3 EACH | Refills: 11 | Status: SHIPPED | OUTPATIENT
Start: 2025-06-12

## 2025-06-12 NOTE — PROGRESS NOTES
Subjective   Patient ID: Van Feliz is a 64 y.o. male who presents for Follow-up.    diabetes is followed by endocrinology. Pt asked for cost assistance with Dexcom G7    Assessment/Plan   Problem List Items Addressed This Visit       Type 1 diabetes mellitus without complication    Relevant Medications    blood-glucose sensor (Dexcom G7 Sensor) device       LAB RESULTS:  Lab Results   Component Value Date    HGBA1C 9.2 (A) 03/26/2025    HGBA1C 8.2 (A) 11/11/2024    HGBA1C 7.6 (A) 07/18/2024     Lab Results   Component Value Date    LDLCALC 77 11/13/2024    CREATININE 0.93 11/13/2024      Lab Results   Component Value Date    CHOL 175 11/13/2024    CHOL 174 06/11/2024    CHOL 158 02/13/2023     Lab Results   Component Value Date    HDL 87.5 11/13/2024    HDL 81.1 06/11/2024    HDL 74.9 02/13/2023       Lab Results   Component Value Date    LDLCALC 77 11/13/2024    LDLCALC 78 06/11/2024     Lab Results   Component Value Date    TRIG 51 11/13/2024    TRIG 74 06/11/2024    TRIG 52 02/13/2023       Lab Results   Component Value Date    UTYDUJXR74 460 06/13/2023       Continue all meds under the continuation of care with the referring provider and clinical pharmacy team.    Rx sent for Dexcom g7 sensors to [facility]- $35/3 sensors/month - per test claim - patient plans to pick-up tomorrow. Reviewed store hours. No follow-up needed - patient has contact info

## 2025-06-13 ENCOUNTER — PHARMACY VISIT (OUTPATIENT)
Dept: PHARMACY | Facility: CLINIC | Age: 65
End: 2025-06-13
Payer: COMMERCIAL

## 2025-06-13 DIAGNOSIS — E10.69 TYPE 1 DIABETES MELLITUS WITH OTHER SPECIFIED COMPLICATION: ICD-10-CM

## 2025-06-16 RX ORDER — INSULIN LISPRO 100 [IU]/ML
INJECTION, SOLUTION INTRAVENOUS; SUBCUTANEOUS
Qty: 45 ML | Refills: 0 | Status: SHIPPED | OUTPATIENT
Start: 2025-06-16

## 2025-07-02 DIAGNOSIS — E78.5 HYPERLIPIDEMIA, UNSPECIFIED HYPERLIPIDEMIA TYPE: ICD-10-CM

## 2025-07-02 DIAGNOSIS — R39.15 URINARY URGENCY: ICD-10-CM

## 2025-07-02 DIAGNOSIS — E03.9 HYPOTHYROIDISM, UNSPECIFIED TYPE: ICD-10-CM

## 2025-07-02 RX ORDER — ATORVASTATIN CALCIUM 10 MG/1
10 TABLET, FILM COATED ORAL DAILY
Qty: 90 TABLET | Refills: 3 | Status: SHIPPED | OUTPATIENT
Start: 2025-07-02 | End: 2026-07-02

## 2025-07-02 RX ORDER — LEVOTHYROXINE SODIUM 200 UG/1
TABLET ORAL
Qty: 90 TABLET | Refills: 3 | Status: SHIPPED | OUTPATIENT
Start: 2025-07-02

## 2025-07-02 RX ORDER — TAMSULOSIN HYDROCHLORIDE 0.4 MG/1
0.4 CAPSULE ORAL DAILY
Qty: 90 CAPSULE | Refills: 3 | Status: SHIPPED | OUTPATIENT
Start: 2025-07-02 | End: 2026-07-02

## 2025-07-05 DIAGNOSIS — R39.15 URINARY URGENCY: ICD-10-CM

## 2025-07-07 DIAGNOSIS — E03.9 HYPOTHYROIDISM, UNSPECIFIED TYPE: ICD-10-CM

## 2025-07-07 RX ORDER — TAMSULOSIN HYDROCHLORIDE 0.4 MG/1
0.4 CAPSULE ORAL DAILY
Qty: 60 CAPSULE | Refills: 0 | Status: SHIPPED | OUTPATIENT
Start: 2025-07-07

## 2025-07-07 RX ORDER — LEVOTHYROXINE SODIUM 200 UG/1
TABLET ORAL
Qty: 90 TABLET | Refills: 3 | Status: SHIPPED | OUTPATIENT
Start: 2025-07-07

## 2025-07-08 PROCEDURE — RXMED WILLOW AMBULATORY MEDICATION CHARGE

## 2025-07-09 ENCOUNTER — PHARMACY VISIT (OUTPATIENT)
Dept: PHARMACY | Facility: CLINIC | Age: 65
End: 2025-07-09
Payer: COMMERCIAL

## 2025-07-22 ENCOUNTER — APPOINTMENT (OUTPATIENT)
Dept: ENDOCRINOLOGY | Facility: CLINIC | Age: 65
End: 2025-07-22
Payer: COMMERCIAL

## 2025-07-22 VITALS
WEIGHT: 176 LBS | SYSTOLIC BLOOD PRESSURE: 124 MMHG | HEIGHT: 71 IN | DIASTOLIC BLOOD PRESSURE: 66 MMHG | BODY MASS INDEX: 24.64 KG/M2

## 2025-07-22 DIAGNOSIS — I10 ESSENTIAL HYPERTENSION, BENIGN: ICD-10-CM

## 2025-07-22 DIAGNOSIS — E10.69 TYPE 1 DIABETES MELLITUS WITH OTHER SPECIFIED COMPLICATION: Primary | ICD-10-CM

## 2025-07-22 DIAGNOSIS — E03.9 ACQUIRED HYPOTHYROIDISM: ICD-10-CM

## 2025-07-22 DIAGNOSIS — E03.9 HYPOTHYROIDISM, UNSPECIFIED TYPE: ICD-10-CM

## 2025-07-22 DIAGNOSIS — R61 NIGHT SWEATS: ICD-10-CM

## 2025-07-22 LAB
POC FINGERSTICK BLOOD GLUCOSE: 139 MG/DL (ref 70–100)
POC HEMOGLOBIN A1C: 8.8 % (ref 4.2–6.5)

## 2025-07-22 PROCEDURE — 3008F BODY MASS INDEX DOCD: CPT | Performed by: HOSPITALIST

## 2025-07-22 PROCEDURE — 3052F HG A1C>EQUAL 8.0%<EQUAL 9.0%: CPT | Performed by: HOSPITALIST

## 2025-07-22 PROCEDURE — 83036 HEMOGLOBIN GLYCOSYLATED A1C: CPT | Performed by: HOSPITALIST

## 2025-07-22 PROCEDURE — 3074F SYST BP LT 130 MM HG: CPT | Performed by: HOSPITALIST

## 2025-07-22 PROCEDURE — 82962 GLUCOSE BLOOD TEST: CPT | Performed by: HOSPITALIST

## 2025-07-22 PROCEDURE — 1159F MED LIST DOCD IN RCRD: CPT | Performed by: HOSPITALIST

## 2025-07-22 PROCEDURE — 99214 OFFICE O/P EST MOD 30 MIN: CPT | Performed by: HOSPITALIST

## 2025-07-22 PROCEDURE — 3078F DIAST BP <80 MM HG: CPT | Performed by: HOSPITALIST

## 2025-07-22 PROCEDURE — RXMED WILLOW AMBULATORY MEDICATION CHARGE

## 2025-07-22 PROCEDURE — 95251 CONT GLUC MNTR ANALYSIS I&R: CPT | Performed by: HOSPITALIST

## 2025-07-22 RX ORDER — GABAPENTIN 100 MG/1
100 CAPSULE ORAL NIGHTLY
Qty: 30 CAPSULE | Refills: 2 | Status: SHIPPED | OUTPATIENT
Start: 2025-07-22 | End: 2025-10-20

## 2025-07-22 RX ORDER — INSULIN LISPRO 100 [IU]/ML
INJECTION, SOLUTION INTRAVENOUS; SUBCUTANEOUS
Start: 2025-07-22

## 2025-07-22 ASSESSMENT — ENCOUNTER SYMPTOMS
DIARRHEA: 0
BACK PAIN: 1
NAUSEA: 0
DYSURIA: 0
FREQUENCY: 0
TROUBLE SWALLOWING: 0
NERVOUS/ANXIOUS: 0
CONSTIPATION: 0
PHOTOPHOBIA: 0
SLEEP DISTURBANCE: 0
TREMORS: 0
HEADACHES: 0
ARTHRALGIAS: 0
ABDOMINAL PAIN: 0
EYE ITCHING: 0
VOMITING: 0
PALPITATIONS: 0
CHEST TIGHTNESS: 0
LIGHT-HEADEDNESS: 0
AGITATION: 0
CONSTITUTIONAL NEGATIVE: 1
SORE THROAT: 0
VOICE CHANGE: 0
ABDOMINAL DISTENTION: 0
SHORTNESS OF BREATH: 0

## 2025-07-22 NOTE — PROGRESS NOTES
Subjective   Patient ID:  Patient ID: Van Feliz is a 64 y.o. male who presents for Diabetes (((Dx: 2020/PCP: Dr. Rodarte /Podiatry: does not see one /Eye exam: yearly/Patient testing glucose 4 times daily with dexcom G7. /Pt adhering and benefiting from cgm treatment. Compliant with diabetic regime. Pt adhering and  and brnefitung from CGM treatment plan. Hypothyroidism  pt sates taking levothyroxine 200 mcg every day and 200mcg  with 2 tabs on Sun   Lab Results   Component Value Date    HGBA1C 8.8 (A) 07/22/2025       Lab Results   Component Value Date    TSH 33.01 (H) 11/13/2024      HPI   See AP     Review of Systems   Constitutional: Negative.    HENT:  Negative for sore throat, trouble swallowing and voice change.    Eyes:  Negative for photophobia, itching and visual disturbance.   Respiratory:  Negative for chest tightness and shortness of breath.    Cardiovascular:  Negative for chest pain and palpitations.   Gastrointestinal:  Negative for abdominal distention, abdominal pain, constipation, diarrhea, nausea and vomiting.   Endocrine: Negative for cold intolerance, heat intolerance and polyuria.   Genitourinary:  Negative for dysuria and frequency.   Musculoskeletal:  Positive for back pain. Negative for arthralgias.   Skin:  Negative for pallor.   Allergic/Immunologic: Negative for environmental allergies.   Neurological:  Negative for tremors, light-headedness and headaches.   Psychiatric/Behavioral:  Negative for agitation and sleep disturbance. The patient is not nervous/anxious.        Objective   Physical Exam  Constitutional:       Appearance: Normal appearance.   HENT:      Head: Normocephalic.      Nose: Nose normal.      Mouth/Throat:      Mouth: Mucous membranes are moist.     Eyes:      Extraocular Movements: Extraocular movements intact.       Cardiovascular:      Rate and Rhythm: Normal rate.   Pulmonary:      Effort: Pulmonary effort is normal. No respiratory distress.   Abdominal:       "General: There is no distension.     Musculoskeletal:         General: Normal range of motion.      Cervical back: Normal range of motion and neck supple.     Skin:     General: Skin is warm and dry.     Neurological:      Mental Status: He is alert and oriented to person, place, and time.     Psychiatric:         Mood and Affect: Mood normal.      Visit Vitals  /66   Ht 1.803 m (5' 11\")   Wt 79.8 kg (176 lb)   BMI 24.55 kg/m²   Smoking Status Every Day   BSA 2 m²        Assessment/Plan   Diagnoses and all orders for this visit:  Type 1 diabetes mellitus with other specified complication  -     POCT glycosylated hemoglobin (Hb A1C) manually resulted  -     POCT fingerstick glucose manually resulted  -     Comprehensive metabolic panel; Future  -     TSH with reflex to Free T4 if abnormal; Future  -     Albumin-Creatinine Ratio, Urine Random; Future  Acquired hypothyroidism  Essential hypertension, benign              DM: was being managed like T 2 DM but lasb showed positive Rosy 64 with low c peptide    given controlled DM since Dx will manage as T1 DM  dx dm: 2 years ago , initially managed as type 2 DM , but he was mis dx, he is typ1 DM      Current regimen:( started basal bolus march 16. 2023)   - lantus 20-0-0-0 ( paying 85 $ per month )   - humalog 5-5-6-0 , and SSI 1: 50 > 150      Past medication:  metformin and glimepiride stopped in past.      He has a Dexcom G7.  Downloaded, interpreted  Active time 68 %.  Time in range 44 % low sugar  3 % ( it was 8 % in past)    Interval history: 5/10/2024 went to ER due to lightheadedness.  Hypoglycemic episode in March 2024 requiring hospitalization.     Dexcom CGM was costing 750 $ in past  Diet - does drink juice occ, pop in past , now only diet pop,     PLAN:   Decrease LANTUS TO 19 UNITS    INCREASE HUMALOG TO 7 UNITS BEFORE BKFST, continue 5 UNITS LUNCH AND continue 6 UNITS DINNER WITH SLIDING SCALE INSULIN   -In past discussed 15 g complex carb before going " to airport    - in past Discussed diabetic diet and diet modifications as a means to control diabetes, advised 45- 60g carb meals and 15 g snacks   He did see Nicolette regarding various insulin pump information.  He has all the information and will reach out when he is ready   -      on statin, LDL at goal in past  ,not on ACei/ ARB, will discuss next visit   dicussed hypoglycemia management. Sent GVOKE pen in past      # hypothyroidism :  6/ 2024 TSH 22.  Dose was changed by primary care physician.     11/ 2024 TSH 33 dose was increased to 225 daily   Repeat TFT       # Night sweats-will check testosterone level  Also for unintentional weight loss.  Weight is stable currently     # DN : b/l feet neuropathy - podiatry follow up  Discussed gabapentin.  Started small dose 100 mg capsule at bedtime.  Discussed side effects including drowsiness dizziness    RTC 3 to 4 months     - lives in [place].   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    travels a lot for work - every other week   Went to [facility] recently for vacation.  He celebrated his birthday there.  His mother-in-law passed away recently.  No vacations planned currently

## 2025-07-25 ENCOUNTER — PHARMACY VISIT (OUTPATIENT)
Dept: PHARMACY | Facility: CLINIC | Age: 65
End: 2025-07-25
Payer: COMMERCIAL

## 2025-07-27 LAB
ALBUMIN SERPL-MCNC: 4.4 G/DL (ref 3.6–5.1)
ALP SERPL-CCNC: 85 U/L (ref 35–144)
ALT SERPL-CCNC: 19 U/L (ref 9–46)
ANION GAP SERPL CALCULATED.4IONS-SCNC: 7 MMOL/L (CALC) (ref 7–17)
AST SERPL-CCNC: 19 U/L (ref 10–35)
BILIRUB SERPL-MCNC: 0.6 MG/DL (ref 0.2–1.2)
BUN SERPL-MCNC: 10 MG/DL (ref 7–25)
CALCIUM SERPL-MCNC: 9 MG/DL (ref 8.6–10.3)
CHLORIDE SERPL-SCNC: 97 MMOL/L (ref 98–110)
CHOLEST SERPL-MCNC: 157 MG/DL
CHOLEST/HDLC SERPL: 2 (CALC)
CO2 SERPL-SCNC: 29 MMOL/L (ref 20–32)
CREAT SERPL-MCNC: 0.97 MG/DL (ref 0.7–1.35)
EGFRCR SERPLBLD CKD-EPI 2021: 86 ML/MIN/1.73M2
GLUCOSE SERPL-MCNC: 327 MG/DL (ref 65–99)
HDLC SERPL-MCNC: 78 MG/DL
LDLC SERPL CALC-MCNC: 65 MG/DL (CALC)
NONHDLC SERPL-MCNC: 79 MG/DL (CALC)
POTASSIUM SERPL-SCNC: 5 MMOL/L (ref 3.5–5.3)
PROT SERPL-MCNC: 7 G/DL (ref 6.1–8.1)
SODIUM SERPL-SCNC: 133 MMOL/L (ref 135–146)
T4 FREE SERPL-MCNC: 1.3 NG/DL (ref 0.8–1.8)
TESTOST SERPL-MCNC: 493 NG/DL (ref 250–827)
TRIGL SERPL-MCNC: 68 MG/DL
TSH SERPL-ACNC: 9.61 MIU/L (ref 0.4–4.5)

## 2025-07-31 ENCOUNTER — OFFICE VISIT (OUTPATIENT)
Dept: PRIMARY CARE | Facility: CLINIC | Age: 65
End: 2025-07-31
Payer: COMMERCIAL

## 2025-07-31 VITALS
TEMPERATURE: 97.7 F | SYSTOLIC BLOOD PRESSURE: 140 MMHG | DIASTOLIC BLOOD PRESSURE: 80 MMHG | HEART RATE: 70 BPM | RESPIRATION RATE: 14 BRPM | WEIGHT: 177 LBS | HEIGHT: 71 IN | BODY MASS INDEX: 24.78 KG/M2

## 2025-07-31 DIAGNOSIS — E03.9 HYPOTHYROIDISM, UNSPECIFIED TYPE: ICD-10-CM

## 2025-07-31 DIAGNOSIS — M79.89 RIGHT LEG SWELLING: Primary | ICD-10-CM

## 2025-07-31 DIAGNOSIS — E10.69 TYPE 1 DIABETES MELLITUS WITH OTHER SPECIFIED COMPLICATION: ICD-10-CM

## 2025-07-31 PROCEDURE — 3077F SYST BP >= 140 MM HG: CPT | Performed by: INTERNAL MEDICINE

## 2025-07-31 PROCEDURE — 3079F DIAST BP 80-89 MM HG: CPT | Performed by: INTERNAL MEDICINE

## 2025-07-31 PROCEDURE — 3008F BODY MASS INDEX DOCD: CPT | Performed by: INTERNAL MEDICINE

## 2025-07-31 PROCEDURE — 99214 OFFICE O/P EST MOD 30 MIN: CPT | Performed by: INTERNAL MEDICINE

## 2025-07-31 PROCEDURE — 1160F RVW MEDS BY RX/DR IN RCRD: CPT | Performed by: INTERNAL MEDICINE

## 2025-07-31 PROCEDURE — 1159F MED LIST DOCD IN RCRD: CPT | Performed by: INTERNAL MEDICINE

## 2025-07-31 PROCEDURE — 3052F HG A1C>EQUAL 8.0%<EQUAL 9.0%: CPT | Performed by: INTERNAL MEDICINE

## 2025-07-31 RX ORDER — LEVOTHYROXINE SODIUM 200 UG/1
200 TABLET ORAL SEE ADMIN INSTRUCTIONS
Qty: 100 TABLET | Refills: 3 | Status: SHIPPED | OUTPATIENT
Start: 2025-07-31 | End: 2025-07-31 | Stop reason: SDUPTHER

## 2025-07-31 RX ORDER — INSULIN LISPRO 100 [IU]/ML
INJECTION, SOLUTION INTRAVENOUS; SUBCUTANEOUS
Status: CANCELLED | OUTPATIENT
Start: 2025-07-31

## 2025-07-31 RX ORDER — LEVOTHYROXINE SODIUM 200 UG/1
200 TABLET ORAL SEE ADMIN INSTRUCTIONS
Qty: 100 TABLET | Refills: 3 | Status: SHIPPED | OUTPATIENT
Start: 2025-07-31 | End: 2026-07-31

## 2025-07-31 ASSESSMENT — ENCOUNTER SYMPTOMS
MYALGIAS: 1
ARTHRALGIAS: 1
SHORTNESS OF BREATH: 0
OCCASIONAL FEELINGS OF UNSTEADINESS: 0

## 2025-07-31 ASSESSMENT — PATIENT HEALTH QUESTIONNAIRE - PHQ9
SUM OF ALL RESPONSES TO PHQ9 QUESTIONS 1 AND 2: 0
1. LITTLE INTEREST OR PLEASURE IN DOING THINGS: NOT AT ALL
2. FEELING DOWN, DEPRESSED OR HOPELESS: NOT AT ALL

## 2025-07-31 NOTE — PROGRESS NOTES
"Subjective   Patient ID: Van Feliz is a 65 y.o. male who presents for No chief complaint on file..    Mr. Feliz comes to the office today for fairly acute swelling of his right leg.  He did recently return from a trip to [facility].  He denies any chest pain or shortness of breath.  He also denies any history of blood clots.  No other trauma and no other identifiable cause to the swelling.      Review of Systems   Respiratory:  Negative for shortness of breath.    Cardiovascular:  Positive for leg swelling.   Musculoskeletal:  Positive for arthralgias and myalgias.        Rt lower  ext edema  / red / and painful/       Objective   /80 (BP Location: Left arm, Patient Position: Sitting, BP Cuff Size: Adult)   Pulse 70   Temp 36.5 °C (97.7 °F) (Tympanic)   Resp 14   Ht 1.803 m (5' 11\")   Wt 80.3 kg (177 lb)   BMI 24.69 kg/m²     Physical Exam  Vitals reviewed.   Constitutional:       Appearance: Normal appearance.   HENT:      Head: Normocephalic.     Cardiovascular:      Rate and Rhythm: Normal rate.   Pulmonary:      Effort: Pulmonary effort is normal.     Musculoskeletal:         General: Normal range of motion.     Neurological:      General: No focal deficit present.      Mental Status: He is alert.     Psychiatric:         Mood and Affect: Mood normal.         Assessment/Plan   Problem List Items Addressed This Visit    None  Visit Diagnoses         Codes      Right leg swelling    -  Primary M79.89    Relevant Orders    Vascular US lower extremity venous duplex right      Hypothyroidism, unspecified type     E03.9        So reviewed discussed the above.  Acute swelling after fairly long car trip to [facility].  We discussed the concern of DVT/blood clot.  Unfortunate were not able to get him in for a duplex ultrasound today and have to have this done tomorrow.  Due to my concern and a higher pretest probability we will give him 2 doses of Eliquis 10 mg tonight 10 mg tomorrow morning with the " duplex tomorrow afternoon.  If it is positive he is to continue the 10 mg twice a day for the first week and then decrease to 5 mg twice a day for several months.  If it is positive we will walk him through that process.  If this is negative we can then proceed with compression elevation and possible diuresis if no response to the compression elevation.  All questions were answered.

## 2025-08-01 ENCOUNTER — HOSPITAL ENCOUNTER (OUTPATIENT)
Dept: CARDIOLOGY | Facility: HOSPITAL | Age: 65
Discharge: HOME | End: 2025-08-01
Payer: COMMERCIAL

## 2025-08-01 DIAGNOSIS — M79.89 RIGHT LEG SWELLING: ICD-10-CM

## 2025-08-01 PROCEDURE — 93971 EXTREMITY STUDY: CPT

## 2025-08-08 ENCOUNTER — PHARMACY VISIT (OUTPATIENT)
Dept: PHARMACY | Facility: CLINIC | Age: 65
End: 2025-08-08
Payer: COMMERCIAL

## 2025-08-08 PROCEDURE — RXMED WILLOW AMBULATORY MEDICATION CHARGE

## 2025-08-14 ENCOUNTER — PHARMACY VISIT (OUTPATIENT)
Dept: PHARMACY | Facility: CLINIC | Age: 65
End: 2025-08-14
Payer: COMMERCIAL

## 2025-08-14 PROCEDURE — RXMED WILLOW AMBULATORY MEDICATION CHARGE

## 2025-08-14 RX ORDER — AMOXICILLIN 500 MG/1
500 CAPSULE ORAL
Qty: 9 CAPSULE | Refills: 0 | OUTPATIENT
Start: 2025-08-14

## 2025-08-21 ENCOUNTER — TELEPHONE (OUTPATIENT)
Dept: ENDOCRINOLOGY | Facility: CLINIC | Age: 65
End: 2025-08-21
Payer: COMMERCIAL

## 2025-08-21 DIAGNOSIS — E03.9 HYPOTHYROIDISM, UNSPECIFIED TYPE: ICD-10-CM

## 2025-08-21 RX ORDER — LEVOTHYROXINE SODIUM 200 UG/1
200 TABLET ORAL SEE ADMIN INSTRUCTIONS
Qty: 100 TABLET | Refills: 3 | Status: SHIPPED | OUTPATIENT
Start: 2025-08-21 | End: 2026-08-21

## 2025-12-02 ENCOUNTER — APPOINTMENT (OUTPATIENT)
Dept: ENDOCRINOLOGY | Facility: CLINIC | Age: 65
End: 2025-12-02
Payer: COMMERCIAL

## 2026-04-09 ENCOUNTER — APPOINTMENT (OUTPATIENT)
Dept: ENDOCRINOLOGY | Facility: CLINIC | Age: 66
End: 2026-04-09
Payer: COMMERCIAL

## 2026-08-06 ENCOUNTER — APPOINTMENT (OUTPATIENT)
Dept: ENDOCRINOLOGY | Facility: CLINIC | Age: 66
End: 2026-08-06
Payer: COMMERCIAL